# Patient Record
Sex: MALE | Race: WHITE | Employment: OTHER | URBAN - METROPOLITAN AREA
[De-identification: names, ages, dates, MRNs, and addresses within clinical notes are randomized per-mention and may not be internally consistent; named-entity substitution may affect disease eponyms.]

---

## 2017-01-04 RX ORDER — ATORVASTATIN CALCIUM 40 MG/1
TABLET, FILM COATED ORAL
Qty: 90 TABLET | Refills: 1 | Status: SHIPPED | OUTPATIENT
Start: 2017-01-04 | End: 2017-06-30 | Stop reason: SDUPTHER

## 2017-01-25 RX ORDER — GABAPENTIN 300 MG/1
CAPSULE ORAL
Qty: 270 CAPSULE | Refills: 1 | Status: SHIPPED | OUTPATIENT
Start: 2017-01-25 | End: 2017-10-10 | Stop reason: SDUPTHER

## 2017-01-25 RX ORDER — ALLOPURINOL 300 MG/1
TABLET ORAL
Qty: 90 TABLET | Refills: 1 | Status: SHIPPED | OUTPATIENT
Start: 2017-01-25 | End: 2017-07-21 | Stop reason: SDUPTHER

## 2017-01-25 RX ORDER — DICLOFENAC SODIUM 75 MG/1
TABLET, DELAYED RELEASE ORAL
Qty: 180 TABLET | Refills: 1 | Status: SHIPPED | OUTPATIENT
Start: 2017-01-25 | End: 2017-07-21 | Stop reason: SDUPTHER

## 2017-04-04 ENCOUNTER — OFFICE VISIT (OUTPATIENT)
Dept: INTERNAL MEDICINE | Age: 67
End: 2017-04-04

## 2017-04-04 VITALS — HEIGHT: 72 IN | BODY MASS INDEX: 42.66 KG/M2 | WEIGHT: 315 LBS

## 2017-04-04 DIAGNOSIS — E66.01 MORBID OBESITY DUE TO EXCESS CALORIES (HCC): ICD-10-CM

## 2017-04-04 DIAGNOSIS — Z79.4 TYPE 2 DIABETES MELLITUS WITHOUT COMPLICATION, WITH LONG-TERM CURRENT USE OF INSULIN (HCC): ICD-10-CM

## 2017-04-04 DIAGNOSIS — Z85.46 PERSONAL HISTORY OF MALIGNANT NEOPLASM OF PROSTATE: ICD-10-CM

## 2017-04-04 DIAGNOSIS — F17.200 TOBACCO USE DISORDER: ICD-10-CM

## 2017-04-04 DIAGNOSIS — Z87.891 PERSONAL HISTORY OF TOBACCO USE: ICD-10-CM

## 2017-04-04 DIAGNOSIS — E66.01 MORBID OBESITY WITH BMI OF 45.0-49.9, ADULT (HCC): ICD-10-CM

## 2017-04-04 DIAGNOSIS — I10 ESSENTIAL HYPERTENSION: Primary | ICD-10-CM

## 2017-04-04 DIAGNOSIS — G47.30 SLEEP APNEA, UNSPECIFIED TYPE: ICD-10-CM

## 2017-04-04 DIAGNOSIS — K21.9 GASTROESOPHAGEAL REFLUX DISEASE WITHOUT ESOPHAGITIS: ICD-10-CM

## 2017-04-04 DIAGNOSIS — E78.2 MIXED HYPERLIPIDEMIA: ICD-10-CM

## 2017-04-04 DIAGNOSIS — E11.9 TYPE 2 DIABETES MELLITUS WITHOUT COMPLICATION, WITH LONG-TERM CURRENT USE OF INSULIN (HCC): ICD-10-CM

## 2017-04-04 PROCEDURE — 99214 OFFICE O/P EST MOD 30 MIN: CPT | Performed by: INTERNAL MEDICINE

## 2017-04-04 PROCEDURE — G0296 VISIT TO DETERM LDCT ELIG: HCPCS | Performed by: INTERNAL MEDICINE

## 2017-04-04 RX ORDER — LISINOPRIL 30 MG/1
30 TABLET ORAL DAILY
COMMUNITY
Start: 2017-04-04

## 2017-04-04 ASSESSMENT — ENCOUNTER SYMPTOMS
EYES NEGATIVE: 1
ALLERGIC/IMMUNOLOGIC NEGATIVE: 1
APNEA: 1
GASTROINTESTINAL NEGATIVE: 1

## 2017-04-06 ENCOUNTER — HOSPITAL ENCOUNTER (OUTPATIENT)
Dept: CT IMAGING | Age: 67
Discharge: HOME | End: 2017-04-06
Attending: INTERNAL MEDICINE | Admitting: INTERNAL MEDICINE

## 2017-04-06 DIAGNOSIS — Z87.891 PERSONAL HISTORY OF NICOTINE DEPENDENCE: ICD-10-CM

## 2017-04-06 DIAGNOSIS — Z87.891 PERSONAL HISTORY OF TOBACCO USE: ICD-10-CM

## 2017-04-10 ENCOUNTER — TELEPHONE (OUTPATIENT)
Dept: CASE MANAGEMENT | Age: 67
End: 2017-04-10

## 2017-06-30 RX ORDER — ATORVASTATIN CALCIUM 40 MG/1
TABLET, FILM COATED ORAL
Qty: 90 TABLET | Refills: 0 | Status: SHIPPED | OUTPATIENT
Start: 2017-06-30 | End: 2017-09-27 | Stop reason: SDUPTHER

## 2017-07-03 RX ORDER — OMEPRAZOLE 40 MG/1
CAPSULE, DELAYED RELEASE ORAL
Qty: 90 CAPSULE | Refills: 0 | Status: SHIPPED | OUTPATIENT
Start: 2017-07-03 | End: 2017-09-29 | Stop reason: SDUPTHER

## 2017-07-21 RX ORDER — ALLOPURINOL 300 MG/1
TABLET ORAL
Qty: 90 TABLET | Refills: 0 | Status: SHIPPED | OUTPATIENT
Start: 2017-07-21 | End: 2017-10-18 | Stop reason: SDUPTHER

## 2017-07-21 RX ORDER — DICLOFENAC SODIUM 75 MG/1
TABLET, DELAYED RELEASE ORAL
Qty: 180 TABLET | Refills: 0 | Status: SHIPPED | OUTPATIENT
Start: 2017-07-21 | End: 2017-10-18 | Stop reason: SDUPTHER

## 2017-08-04 ENCOUNTER — TELEPHONE (OUTPATIENT)
Dept: INTERNAL MEDICINE | Age: 67
End: 2017-08-04

## 2017-09-13 DIAGNOSIS — K21.9 GASTROESOPHAGEAL REFLUX DISEASE WITHOUT ESOPHAGITIS: ICD-10-CM

## 2017-09-13 DIAGNOSIS — G47.30 SLEEP APNEA, UNSPECIFIED TYPE: ICD-10-CM

## 2017-09-13 DIAGNOSIS — F17.200 TOBACCO USE DISORDER: ICD-10-CM

## 2017-09-13 DIAGNOSIS — E78.2 MIXED HYPERLIPIDEMIA: ICD-10-CM

## 2017-09-13 DIAGNOSIS — Z79.4 TYPE 2 DIABETES MELLITUS WITHOUT COMPLICATION, WITH LONG-TERM CURRENT USE OF INSULIN (HCC): ICD-10-CM

## 2017-09-13 DIAGNOSIS — I10 ESSENTIAL HYPERTENSION: ICD-10-CM

## 2017-09-13 DIAGNOSIS — E11.9 TYPE 2 DIABETES MELLITUS WITHOUT COMPLICATION, WITH LONG-TERM CURRENT USE OF INSULIN (HCC): ICD-10-CM

## 2017-09-13 DIAGNOSIS — Z87.891 PERSONAL HISTORY OF TOBACCO USE: ICD-10-CM

## 2017-09-13 DIAGNOSIS — E66.01 MORBID OBESITY DUE TO EXCESS CALORIES (HCC): ICD-10-CM

## 2017-09-13 DIAGNOSIS — Z85.46 PERSONAL HISTORY OF MALIGNANT NEOPLASM OF PROSTATE: ICD-10-CM

## 2017-09-13 LAB
A/G RATIO: 1.6 (ref 1.1–2.2)
ALBUMIN SERPL-MCNC: 4.2 G/DL (ref 3.4–5)
ALP BLD-CCNC: 134 U/L (ref 40–129)
ALT SERPL-CCNC: 30 U/L (ref 10–40)
ANION GAP SERPL CALCULATED.3IONS-SCNC: 16 MMOL/L (ref 3–16)
AST SERPL-CCNC: 20 U/L (ref 15–37)
BASOPHILS ABSOLUTE: 0.1 K/UL (ref 0–0.2)
BASOPHILS RELATIVE PERCENT: 0.9 %
BILIRUB SERPL-MCNC: 0.3 MG/DL (ref 0–1)
BILIRUBIN URINE: NEGATIVE
BLOOD, URINE: NEGATIVE
BUN BLDV-MCNC: 23 MG/DL (ref 7–20)
CALCIUM SERPL-MCNC: 10.2 MG/DL (ref 8.3–10.6)
CHLORIDE BLD-SCNC: 100 MMOL/L (ref 99–110)
CHOLESTEROL, TOTAL: 154 MG/DL (ref 0–199)
CLARITY: CLEAR
CO2: 26 MMOL/L (ref 21–32)
COLOR: YELLOW
CREAT SERPL-MCNC: 0.9 MG/DL (ref 0.8–1.3)
CREATININE URINE: 73.7 MG/DL (ref 39–259)
EOSINOPHILS ABSOLUTE: 0.3 K/UL (ref 0–0.6)
EOSINOPHILS RELATIVE PERCENT: 4.4 %
GFR AFRICAN AMERICAN: >60
GFR NON-AFRICAN AMERICAN: >60
GLOBULIN: 2.6 G/DL
GLUCOSE BLD-MCNC: 168 MG/DL (ref 70–99)
GLUCOSE URINE: 100 MG/DL
HCT VFR BLD CALC: 44.9 % (ref 40.5–52.5)
HDLC SERPL-MCNC: 38 MG/DL (ref 40–60)
HEMOGLOBIN: 14.7 G/DL (ref 13.5–17.5)
KETONES, URINE: NEGATIVE MG/DL
LDL CHOLESTEROL CALCULATED: 75 MG/DL
LEUKOCYTE ESTERASE, URINE: NEGATIVE
LYMPHOCYTES ABSOLUTE: 1.8 K/UL (ref 1–5.1)
LYMPHOCYTES RELATIVE PERCENT: 22.4 %
MCH RBC QN AUTO: 28.1 PG (ref 26–34)
MCHC RBC AUTO-ENTMCNC: 32.6 G/DL (ref 31–36)
MCV RBC AUTO: 86.2 FL (ref 80–100)
MICROALBUMIN UR-MCNC: <1.2 MG/DL
MICROALBUMIN/CREAT UR-RTO: NORMAL MG/G (ref 0–30)
MICROSCOPIC EXAMINATION: ABNORMAL
MONOCYTES ABSOLUTE: 0.6 K/UL (ref 0–1.3)
MONOCYTES RELATIVE PERCENT: 7.1 %
NEUTROPHILS ABSOLUTE: 5.1 K/UL (ref 1.7–7.7)
NEUTROPHILS RELATIVE PERCENT: 65.2 %
NITRITE, URINE: NEGATIVE
PDW BLD-RTO: 17.5 % (ref 12.4–15.4)
PH UA: 5.5
PLATELET # BLD: 266 K/UL (ref 135–450)
PMV BLD AUTO: 8.1 FL (ref 5–10.5)
POTASSIUM SERPL-SCNC: 5.1 MMOL/L (ref 3.5–5.1)
PROSTATE SPECIFIC ANTIGEN: 5.26 NG/ML (ref 0–4)
PROTEIN UA: NEGATIVE MG/DL
RBC # BLD: 5.21 M/UL (ref 4.2–5.9)
SODIUM BLD-SCNC: 142 MMOL/L (ref 136–145)
SPECIFIC GRAVITY UA: 1.02
TOTAL PROTEIN: 6.8 G/DL (ref 6.4–8.2)
TRIGL SERPL-MCNC: 204 MG/DL (ref 0–150)
TSH SERPL DL<=0.05 MIU/L-ACNC: 1.44 UIU/ML (ref 0.27–4.2)
URINE TYPE: ABNORMAL
UROBILINOGEN, URINE: 0.2 E.U./DL
VLDLC SERPL CALC-MCNC: 41 MG/DL
WBC # BLD: 7.8 K/UL (ref 4–11)

## 2017-09-14 LAB
ESTIMATED AVERAGE GLUCOSE: 148.5 MG/DL
HBA1C MFR BLD: 6.8 %

## 2017-09-27 RX ORDER — ATORVASTATIN CALCIUM 40 MG/1
TABLET, FILM COATED ORAL
Qty: 90 TABLET | Refills: 1 | Status: SHIPPED | OUTPATIENT
Start: 2017-09-27 | End: 2018-03-26 | Stop reason: SDUPTHER

## 2017-09-29 RX ORDER — OMEPRAZOLE 40 MG/1
CAPSULE, DELAYED RELEASE ORAL
Qty: 90 CAPSULE | Refills: 1 | Status: SHIPPED | OUTPATIENT
Start: 2017-09-29 | End: 2018-03-26 | Stop reason: SDUPTHER

## 2017-10-04 ENCOUNTER — OFFICE VISIT (OUTPATIENT)
Dept: INTERNAL MEDICINE | Age: 67
End: 2017-10-04

## 2017-10-04 VITALS
HEART RATE: 76 BPM | RESPIRATION RATE: 16 BRPM | SYSTOLIC BLOOD PRESSURE: 126 MMHG | BODY MASS INDEX: 42.66 KG/M2 | HEIGHT: 72 IN | DIASTOLIC BLOOD PRESSURE: 76 MMHG | WEIGHT: 315 LBS

## 2017-10-04 DIAGNOSIS — E66.01 MORBID OBESITY DUE TO EXCESS CALORIES (HCC): ICD-10-CM

## 2017-10-04 DIAGNOSIS — E78.2 MIXED HYPERLIPIDEMIA: ICD-10-CM

## 2017-10-04 DIAGNOSIS — Z79.4 TYPE 2 DIABETES MELLITUS WITHOUT COMPLICATION, WITH LONG-TERM CURRENT USE OF INSULIN (HCC): ICD-10-CM

## 2017-10-04 DIAGNOSIS — K21.9 GASTROESOPHAGEAL REFLUX DISEASE WITHOUT ESOPHAGITIS: ICD-10-CM

## 2017-10-04 DIAGNOSIS — R29.898 WEAKNESS OF BOTH LOWER EXTREMITIES: ICD-10-CM

## 2017-10-04 DIAGNOSIS — G47.30 SLEEP APNEA, UNSPECIFIED TYPE: ICD-10-CM

## 2017-10-04 DIAGNOSIS — Z00.00 WELL ADULT EXAM: Primary | ICD-10-CM

## 2017-10-04 DIAGNOSIS — Z00.00 ROUTINE GENERAL MEDICAL EXAMINATION AT A HEALTH CARE FACILITY: ICD-10-CM

## 2017-10-04 DIAGNOSIS — F17.200 TOBACCO USE DISORDER: ICD-10-CM

## 2017-10-04 DIAGNOSIS — Z23 NEED FOR INFLUENZA VACCINATION: ICD-10-CM

## 2017-10-04 DIAGNOSIS — I10 ESSENTIAL HYPERTENSION: ICD-10-CM

## 2017-10-04 DIAGNOSIS — E11.9 TYPE 2 DIABETES MELLITUS WITHOUT COMPLICATION, WITH LONG-TERM CURRENT USE OF INSULIN (HCC): ICD-10-CM

## 2017-10-04 DIAGNOSIS — Z85.46 PERSONAL HISTORY OF MALIGNANT NEOPLASM OF PROSTATE: ICD-10-CM

## 2017-10-04 PROCEDURE — G0438 PPPS, INITIAL VISIT: HCPCS | Performed by: INTERNAL MEDICINE

## 2017-10-04 PROCEDURE — 90662 IIV NO PRSV INCREASED AG IM: CPT | Performed by: INTERNAL MEDICINE

## 2017-10-04 PROCEDURE — G0008 ADMIN INFLUENZA VIRUS VAC: HCPCS | Performed by: INTERNAL MEDICINE

## 2017-10-04 PROCEDURE — 93000 ELECTROCARDIOGRAM COMPLETE: CPT | Performed by: INTERNAL MEDICINE

## 2017-10-04 PROCEDURE — 99214 OFFICE O/P EST MOD 30 MIN: CPT | Performed by: INTERNAL MEDICINE

## 2017-10-04 ASSESSMENT — ENCOUNTER SYMPTOMS
GASTROINTESTINAL NEGATIVE: 1
EYES NEGATIVE: 1
APNEA: 1
ALLERGIC/IMMUNOLOGIC NEGATIVE: 1

## 2017-10-04 ASSESSMENT — LIFESTYLE VARIABLES: HOW OFTEN DO YOU HAVE A DRINK CONTAINING ALCOHOL: 0

## 2017-10-04 ASSESSMENT — ANXIETY QUESTIONNAIRES: GAD7 TOTAL SCORE: 0

## 2017-10-04 ASSESSMENT — PATIENT HEALTH QUESTIONNAIRE - PHQ9: SUM OF ALL RESPONSES TO PHQ QUESTIONS 1-9: 0

## 2017-10-04 NOTE — PATIENT INSTRUCTIONS
Personalized Preventive Plan for Félix Peng - 10/4/2017  Medicare offers a range of preventive health benefits. Some of the tests and screenings are paid in full while other may be subject to a deductible, co-insurance, and/or copay. Some of these benefits include a comprehensive review of your medical history including lifestyle, illnesses that may run in your family, and various assessments and screenings as appropriate. After reviewing your medical record and screening and assessments performed today your provider may have ordered immunizations, labs, imaging, and/or referrals for you. A list of these orders (if applicable) as well as your Preventive Care list are included within your After Visit Summary for your review. Other Preventive Recommendations:    · A preventive eye exam performed by an eye specialist is recommended every 1-2 years to screen for glaucoma; cataracts, macular degeneration, and other eye disorders. · A preventive dental visit is recommended every 6 months. · Try to get at least 150 minutes of exercise per week or 10,000 steps per day on a pedometer . · Order or download the FREE \"Exercise & Physical Activity: Your Everyday Guide\" from [employer]. Call 8-493.970.9793 or search [employer] online. · You need 8423-0760 mg of calcium and 7903-3529 IU of vitamin D per day. It is possible to meet your calcium requirement with diet alone, but a vitamin D supplement is usually necessary to meet this goal.  · When exposed to the sun, use a sunscreen that protects against both UVA and UVB radiation with an SPF of 30 or greater. Reapply every 2 to 3 hours or after sweating, drying off with a towel, or swimming. · Always wear a seat belt when traveling in a car. Always wear a helmet when riding a bicycle or motorcycle.

## 2017-10-04 NOTE — ASSESSMENT & PLAN NOTE
Needs to see Dr Kirk Gonsalez he say he will most likely not see him he understands ramifications of not following threw

## 2017-10-04 NOTE — MR AVS SNAPSHOT
After Visit Summary             Angelita Rangel   10/4/2017 10:30 AM   Office Visit    Description:  Male : 1950   Provider:  Tony Navarro MD   Department:  [facility] [address]              Your Follow-Up and Future Appointments         Below is a list of your follow-up and future appointments. This may not be a complete list as you may have made appointments directly with providers that we are not aware of or your providers may have made some for you. Please call your providers to confirm appointments. It is important to keep your appointments. Please bring your current insurance card, photo ID, co-pay, and all medication bottles to your appointment. If self-pay, payment is expected at the time of service. Your To-Do List     Follow-Up    Return in about 3 months (around 2018). Information from Your Visit        Department     Name Address Phone Fax    [facility] [address] [place] [address] [phone]      You Were Seen for:         Comments    Well adult exam   [[id number]]         Vital Signs     Blood Pressure Pulse Respirations Height Weight Body Mass Index    126/76 (Site: Right Arm, Position: Sitting, Cuff Size: Large Adult) 76 16 6' 0.25\" (1.835 m) 341 lb 12.8 oz (155 kg) 46.04 kg/m2    Smoking Status                   Current Every Day Smoker           Additional Information about your Body Mass Index (BMI)           Your BMI as listed above is considered obese (30 or more). BMI is an estimate of body fat, calculated from your height and weight. The higher your BMI, the greater your risk of heart disease, high blood pressure, type 2 diabetes, stroke, gallstones, arthritis, sleep apnea, and certain cancers. BMI is not perfect. It may overestimate body fat in athletes and people who are more muscular.   Even a small weight loss (between 5 and 10 percent of your current weight) by decreasing your calorie intake and Blood Glucose Monitoring Suppl (ONE TOUCH ULTRA) by Does not apply route. Test strips      Allergies           No Known Allergies      We Ordered/Performed the following           EKG 12 Lead          Result Summary for EKG 12 Lead      Result Information     Status          Edited Result - FINAL (Resulted: 10/4/2017 12:00 AM)           10/4/2017 10:55 AM      Scans on Order [id number]            ECG on 10/4/2017 10:49 AM : ECG Report                     Additional Information        Basic Information     Date Of Birth Sex Race Ethnicity Preferred Language    1950 Male White Non-/Non  English      Problem List as of 10/4/2017  Date Reviewed: 4/4/2017                GERD (gastroesophageal reflux disease)    Weakness of both lower extremities    Well adult exam    Type 2 diabetes mellitus without complication ([address]    Morbid obesity ([address]    Essential hypertension    Mixed hyperlipidemia    Personal history of malignant neoplasm of prostate    Sleep apnea    Tobacco use disorder      Immunizations as of 10/4/2017     Name Date    Influenza Virus Vaccine 11/16/2014, 11/16/2013, 9/27/2012    Influenza Whole 10/28/2011, 10/31/2008    Influenza, High Dose 9/4/2016, 9/16/2015    Pneumococcal 13-valent Conjugate ([id number]) 9/16/2015    Pneumococcal Polysaccharide ([id number]) 12/16/2014    Tdap (Boostrix, Adacel) 6/12/2013    Zoster 10/18/2013      Preventive Care        Date Due    One-time abdominal aortic aneurism (AAA) screening is recommended for all men between the age of 73-68 who have ever smoked 1950    Hepatitis C screening is recommended for all adults regardless of risk factors born between [facility] at least once (lifetime) who have never been tested.  1950    Colonoscopy 6/9/1998    Diabetic Foot Exam 4/29/2017    Yearly Flu Vaccine (1) 9/1/2017    Eye Exam By An Eye Doctor 8/4/2018    Hemoglobin A1C (Test For Long-Term Glucose Control) 9/13/2018 Urine Check For Kidney Problems 9/13/2018    Cholesterol Screening 9/13/2018    Tetanus Combination Vaccine (2 - Td) 6/12/2023            [employer] Signup           Thank you for enrolling in [address] Please follow the instructions below to securely access your online medical record. [employer] allows you to send messages to your doctor, view your test results, renew your prescriptions, schedule appointments, and more. How Do I Sign Up? 1. In your Internet browser, go to [email or web address]  2. Click on the Sign Up Now link in the Sign In box. You will see the New Member Sign Up page. 3. Enter your [employer] Access Code exactly as it appears below. You will not need to use this code after youve completed the sign-up process. If you do not sign up before the expiration date, you must request a new code. [employer] Access Code: [email or web address]  Expires: 12/3/2017 11:18 AM    4. Enter your Social Security Number ([id number]) and Date of Birth (mm/dd/yyyy) as indicated and click Submit. You will be taken to the next sign-up page. 5. Create a [employer] ID. This will be your [employer] login ID and cannot be changed, so think of one that is secure and easy to remember. 6. Create a [employer] password. You can change your password at any time. 7. Enter your Password Reset Question and Answer. This can be used at a later time if you forget your password. 8. Enter your e-mail address. You will receive e-mail notification when new information is available in [address] 9. Click Sign Up. You can now view your medical record. Additional Information  If you have questions, please contact the physician practice where you receive care. Remember, [employer] is NOT to be used for urgent needs. For medical emergencies, dial 911. For questions regarding your [employer] account call [phone]. If you have a clinical question, please call your doctor's office.

## 2017-10-04 NOTE — ASSESSMENT & PLAN NOTE
Within normal limits for age-retired no ADL issues,immunizations up to date, no depression ,no cognitive impairment  Colonoscopy needs 10 year F/U refer to Dr Minerva Roche -still needs to be done   Eye exam up to date  Exercises as tolerated-needs to     No living will but does  want resuscitation -but no life support long term

## 2017-10-04 NOTE — PROGRESS NOTES
motion. Tenderness: knees improved    Neurological: He is alert and oriented to person, place, and time. He has normal reflexes. DIABETIC FOOT EXAM:   normal DP and PT pulses, no trophic changes or ulcerative lesions and normal sensory exam     Skin: Skin is warm and dry. Mult keratotic lesions of legs    Psychiatric: He has a normal mood and affect. His behavior is normal. Thought content normal.       Assessment:              Plan:        Essential hypertension  Stable continue current meds and return in 3 mo. GERD (gastroesophageal reflux disease)  Diet and meds well controled     Mixed hyperlipidemia  Stable continue current meds and return in 3 mo. Morbid obesity (HCC)  Down 8#    Personal history of malignant neoplasm of prostate  Needs to see Dr Keys Aas he say he will most likely not see him he understands ramifications of not following threw     Sleep apnea  Cont to use C-pap     Tobacco use disorder  Had neg.  CT as noted refuses to DC for now     Type 2 diabetes mellitus without complication ([facility]  Cont improved control cont current Tx     Weakness of both lower extremities  As noted     Well adult exam  Within normal limits for age-retired no ADL issues,immunizations up to date, no depression ,no cognitive impairment  Colonoscopy needs 10 year F/U refer to Dr Minerva Roche -still needs to be done   Eye exam up to date  Exercises as tolerated-needs to     No living will but does  want resuscitation -but no life support long term           Mandy Gobble  1950    No Known Allergies  Current Outpatient Prescriptions   Medication Sig Dispense Refill    insulin glargine (TOUJEO SOLOSTAR) 300 UNIT/ML injection pen Inject 25 Units into the skin nightly      omeprazole (PRILOSEC) 40 MG delayed release capsule TAKE ONE CAPSULE BY MOUTH DAILY 90 capsule 1    atorvastatin (LIPITOR) 40 MG tablet TAKE ONE TABLET BY MOUTH DAILY 90 tablet 1    diclofenac (VOLTAREN) 75 MG EC tablet TAKE ONE TABLET BY MOUTH TWICE A  tablet 0    allopurinol (ZYLOPRIM) 300 MG tablet TAKE ONE TABLET BY MOUTH DAILY 90 tablet 0    metFORMIN (GLUCOPHAGE) 500 MG tablet TAKE TWO TABLETS BY MOUTH TWICE A  tablet 0    lisinopril (PRINIVIL;ZESTRIL) 30 MG tablet Take 1 tablet by mouth daily      Dulaglutide 0.75 MG/0.5ML SOPN Inject 1.5 mg into the skin once a week 12 Pen 1    gabapentin (NEURONTIN) 300 MG capsule TAKE ONE CAPSULE BY MOUTH THREE TIMES A  capsule 1    CPAP Machine MISC by Does not apply route Pressure needs to be raised to 15 2 each 5    Respiratory Therapy Supplies RACHID 1 Device by Does not apply route every 3 hours as needed. Mask & Supplies Dx: Sleep Apnea 780.57 1 Device 0    fish oil-omega-3 fatty acids (FISH OIL) 1000 MG capsule Take 1 g by mouth daily       aspirin 81 MG EC tablet Take 81 mg by mouth daily.  B-D ULTRA FINE LANCETS MISC by Does not apply route. 29 g x12mm       Blood Glucose Monitoring Suppl (ONE TOUCH ULTRA) by Does not apply route. Test strips       No current facility-administered medications for this visit. Vitals:    10/04/17 1024   BP: 126/76   Site: Right Arm   Position: Sitting   Cuff Size: Large Adult   Pulse: 76   Resp: 16   Weight: (!) 341 lb 12.8 oz (155 kg)   Height: 6' 0.25\" (1.835 m)     Body mass index is 46.04 kg/(m^2). Wt Readings from Last 3 Encounters:   10/04/17 (!) 341 lb 12.8 oz (155 kg)   04/04/17 (!) 349 lb 12.8 oz (158.7 kg)   10/04/16 (!) 350 lb 3.2 oz (158.8 kg)     BP Readings from Last 3 Encounters:   10/04/17 126/76   10/04/16 138/78   04/29/16 132/80       Consultants:   Patient Care Team:  Jens Garcia MD as PCP - General (Internal Medicine)  Jens Garcia MD as PCP - S Attributed Provider  Yesica Yusuf.  Jillian Grace (Inactive) as Consulting Physician (Endocrinology)  Margaret Ramos as Consulting Physician (Ophthalmology)    Chief Complaint:   Kirill Wesley is a 69 y.o. male who presents for Medicare Preventive Physical Examination with Personalized Prevention Plan Services. HPI: Tr review listed chronic problems     Patient Active Problem List   Diagnosis    Type 2 diabetes mellitus without complication ([facility]    Morbid obesity ([facility]    Essential hypertension    Mixed hyperlipidemia    Personal history of malignant neoplasm of prostate    Sleep apnea    Tobacco use disorder    Well adult exam    Weakness of both lower extremities    GERD (gastroesophageal reflux disease)       Mood Disorders Screen:  Risk factors: livesd alone and health issdues  Symptoms:  endorses fatigue, denies depressed mood, difficulty concentrating, difficulty sleeping and changes in appetite     Safety Assessment:  Functional ability/ADLs:  Difficulty with bathing- no, grooming- no, meals- no, incontinence- yes - stress symptoms . Driving- yes. Home safety: Lives alone. Number of stairs to enter home: 1, within home: 12 between floors. Risk factors for falls: poor nutritional status morbis obesity. Home environment modifications:  yes - rails in BR . End of Life Planning:  Advanced Directive: has an advanced directive - a copy has been provided.       Preventive Care:  Self-testicular exams: Yes  Last PSA: 5.26, abnormal  Last colonoscopy: 1998, normal-refuses F/U  AAA screening:  no   Last eye exam: 2017, glasses early cataracts no diabetic changes  Exercise: refuses to  Seatbelt use: yes      Immunization History   Administered Date(s) Administered    Influenza Virus Vaccine 09/27/2012, 11/16/2013, 11/16/2014    Influenza Whole 10/31/2008, 10/28/2011    Influenza, High Dose 09/16/2015, 09/04/2016    Pneumococcal 13-valent Conjugate (Fnlfpxd37) 09/16/2015    Pneumococcal Polysaccharide (Izlmzqbql04) 12/16/2014    Tdap (Boostrix, Adacel) 06/12/2013    Zoster 10/18/2013       Past Medical History:   Diagnosis Date    Cancer [facility]) 2007    Prostate Tx with rads Dr Rivera Stoner Hyperlipidemia     Type II or unspecified type diabetes Yes Historical Provider, MD     Past Medical History:   Diagnosis Date    Cancer [facility]) 2007    Prostate Tx with rads Dr Liu Celeste Hyperlipidemia     Type II or unspecified type diabetes mellitus without mention of complication, not stated as uncontrolled     now seeing Dr Raine Rojas     Past Surgical History:   Procedure Laterality Date    [place]    normal     PROSTATE SURGERY  2007    positive biopsy for cancer     Family History   Problem Relation Age of Onset    Coronary Art Dis Father     Heart Disease Father     Diabetes Brother        CareTeam (Including outside providers/suppliers regularly involved in providing care):   Patient Care Team:  John Kiser MD as PCP - General (Internal Medicine)  John Kiser MD as PCP - S Attributed Provider  Trey Bañuelos. Mar Stanton (Inactive) as Consulting Physician (Endocrinology)  Ed Carrasco as Consulting Physician (Ophthalmology)    Wt Readings from Last 3 Encounters:   10/04/17 (!) 341 lb 12.8 oz (155 kg)   04/04/17 (!) 349 lb 12.8 oz (158.7 kg)   10/04/16 (!) 350 lb 3.2 oz (158.8 kg)     Vitals:    10/04/17 1024   BP: 126/76   Site: Right Arm   Position: Sitting   Cuff Size: Large Adult   Pulse: 76   Resp: 16   Weight: (!) 341 lb 12.8 oz (155 kg)   Height: 6' 0.25\" (1.835 m)       See above    The following problems were reviewed today and where indicated follow up appointments were made and/or referrals ordered. Risk Factor Screenings with Interventions:   Fall Risk:  2 or more falls in past year?: no  Fall with injury in past year?: no  Fall Risk Interventions:    · Home safety tips provided  · Home exercises provided to promote strength and balance    Depression:  PHQ-2 Score: 0  Depression Interventions:  · None indicated    Anxiety:  Anxiety Score: 0  Anxiety Interventions:  · None indicated    Cognitive:   Words recalled: 3  Clock Drawing Test (CDT) Score: Normal  Cognitive Impairment Interventions:  · None

## 2017-10-10 RX ORDER — GABAPENTIN 300 MG/1
CAPSULE ORAL
Qty: 270 CAPSULE | Refills: 1 | Status: SHIPPED | OUTPATIENT
Start: 2017-10-10 | End: 2018-04-07 | Stop reason: SDUPTHER

## 2017-10-18 RX ORDER — DICLOFENAC SODIUM 75 MG/1
TABLET, DELAYED RELEASE ORAL
Qty: 180 TABLET | Refills: 1 | Status: SHIPPED | OUTPATIENT
Start: 2017-10-18 | End: 2018-04-13 | Stop reason: SDUPTHER

## 2017-10-18 RX ORDER — ALLOPURINOL 300 MG/1
TABLET ORAL
Qty: 90 TABLET | Refills: 1 | Status: SHIPPED | OUTPATIENT
Start: 2017-10-18 | End: 2018-04-13 | Stop reason: SDUPTHER

## 2018-03-26 RX ORDER — OMEPRAZOLE 40 MG/1
CAPSULE, DELAYED RELEASE ORAL
Qty: 90 CAPSULE | Refills: 0 | Status: SHIPPED | OUTPATIENT
Start: 2018-03-26 | End: 2018-06-21 | Stop reason: SDUPTHER

## 2018-03-26 RX ORDER — ATORVASTATIN CALCIUM 40 MG/1
TABLET, FILM COATED ORAL
Qty: 90 TABLET | Refills: 0 | Status: SHIPPED | OUTPATIENT
Start: 2018-03-26 | End: 2018-06-20 | Stop reason: SDUPTHER

## 2018-04-02 ENCOUNTER — OFFICE VISIT (OUTPATIENT)
Dept: INTERNAL MEDICINE | Age: 68
End: 2018-04-02

## 2018-04-02 VITALS
HEART RATE: 68 BPM | BODY MASS INDEX: 42.66 KG/M2 | DIASTOLIC BLOOD PRESSURE: 70 MMHG | HEIGHT: 72 IN | SYSTOLIC BLOOD PRESSURE: 120 MMHG | RESPIRATION RATE: 16 BRPM | WEIGHT: 315 LBS

## 2018-04-02 DIAGNOSIS — E11.9 TYPE 2 DIABETES MELLITUS WITHOUT COMPLICATION, WITH LONG-TERM CURRENT USE OF INSULIN (HCC): ICD-10-CM

## 2018-04-02 DIAGNOSIS — Z85.46 PERSONAL HISTORY OF MALIGNANT NEOPLASM OF PROSTATE: ICD-10-CM

## 2018-04-02 DIAGNOSIS — G47.30 SLEEP APNEA, UNSPECIFIED TYPE: ICD-10-CM

## 2018-04-02 DIAGNOSIS — F17.200 TOBACCO USE DISORDER: ICD-10-CM

## 2018-04-02 DIAGNOSIS — E78.2 MIXED HYPERLIPIDEMIA: ICD-10-CM

## 2018-04-02 DIAGNOSIS — E66.01 MORBID OBESITY (HCC): ICD-10-CM

## 2018-04-02 DIAGNOSIS — I10 ESSENTIAL HYPERTENSION: ICD-10-CM

## 2018-04-02 DIAGNOSIS — Z79.4 TYPE 2 DIABETES MELLITUS WITHOUT COMPLICATION, WITH LONG-TERM CURRENT USE OF INSULIN (HCC): ICD-10-CM

## 2018-04-02 DIAGNOSIS — I10 ESSENTIAL HYPERTENSION: Primary | ICD-10-CM

## 2018-04-02 DIAGNOSIS — K21.9 GASTROESOPHAGEAL REFLUX DISEASE WITHOUT ESOPHAGITIS: ICD-10-CM

## 2018-04-02 DIAGNOSIS — E66.01 MORBID OBESITY WITH BMI OF 45.0-49.9, ADULT (HCC): ICD-10-CM

## 2018-04-02 LAB
A/G RATIO: 1.7 (ref 1.1–2.2)
ALBUMIN SERPL-MCNC: 4.2 G/DL (ref 3.4–5)
ALP BLD-CCNC: 131 U/L (ref 40–129)
ALT SERPL-CCNC: 17 U/L (ref 10–40)
ANION GAP SERPL CALCULATED.3IONS-SCNC: 21 MMOL/L (ref 3–16)
AST SERPL-CCNC: 18 U/L (ref 15–37)
BASOPHILS ABSOLUTE: 0.1 K/UL (ref 0–0.2)
BASOPHILS RELATIVE PERCENT: 0.8 %
BILIRUB SERPL-MCNC: 0.4 MG/DL (ref 0–1)
BUN BLDV-MCNC: 32 MG/DL (ref 7–20)
CALCIUM SERPL-MCNC: 9.4 MG/DL (ref 8.3–10.6)
CHLORIDE BLD-SCNC: 100 MMOL/L (ref 99–110)
CHOLESTEROL, TOTAL: 141 MG/DL (ref 0–199)
CO2: 22 MMOL/L (ref 21–32)
CREAT SERPL-MCNC: 1 MG/DL (ref 0.8–1.3)
EOSINOPHILS ABSOLUTE: 0.3 K/UL (ref 0–0.6)
EOSINOPHILS RELATIVE PERCENT: 4 %
GFR AFRICAN AMERICAN: >60
GFR NON-AFRICAN AMERICAN: >60
GLOBULIN: 2.5 G/DL
GLUCOSE BLD-MCNC: 106 MG/DL (ref 70–99)
HCT VFR BLD CALC: 41.5 % (ref 40.5–52.5)
HDLC SERPL-MCNC: 46 MG/DL (ref 40–60)
HEMOGLOBIN: 13.6 G/DL (ref 13.5–17.5)
LDL CHOLESTEROL CALCULATED: 69 MG/DL
LYMPHOCYTES ABSOLUTE: 1.4 K/UL (ref 1–5.1)
LYMPHOCYTES RELATIVE PERCENT: 22.1 %
MCH RBC QN AUTO: 28 PG (ref 26–34)
MCHC RBC AUTO-ENTMCNC: 32.9 G/DL (ref 31–36)
MCV RBC AUTO: 85.2 FL (ref 80–100)
MONOCYTES ABSOLUTE: 0.5 K/UL (ref 0–1.3)
MONOCYTES RELATIVE PERCENT: 8.3 %
NEUTROPHILS ABSOLUTE: 4.1 K/UL (ref 1.7–7.7)
NEUTROPHILS RELATIVE PERCENT: 64.8 %
PDW BLD-RTO: 17 % (ref 12.4–15.4)
PLATELET # BLD: 294 K/UL (ref 135–450)
PMV BLD AUTO: 7.8 FL (ref 5–10.5)
POTASSIUM SERPL-SCNC: 5.2 MMOL/L (ref 3.5–5.1)
PROSTATE SPECIFIC ANTIGEN: 5.3 NG/ML (ref 0–4)
RBC # BLD: 4.86 M/UL (ref 4.2–5.9)
SODIUM BLD-SCNC: 143 MMOL/L (ref 136–145)
TOTAL PROTEIN: 6.7 G/DL (ref 6.4–8.2)
TRIGL SERPL-MCNC: 131 MG/DL (ref 0–150)
TSH REFLEX: 1.3 UIU/ML (ref 0.27–4.2)
VLDLC SERPL CALC-MCNC: 26 MG/DL
WBC # BLD: 6.3 K/UL (ref 4–11)

## 2018-04-02 PROCEDURE — G8417 CALC BMI ABV UP PARAM F/U: HCPCS | Performed by: INTERNAL MEDICINE

## 2018-04-02 PROCEDURE — 1123F ACP DISCUSS/DSCN MKR DOCD: CPT | Performed by: INTERNAL MEDICINE

## 2018-04-02 PROCEDURE — 3046F HEMOGLOBIN A1C LEVEL >9.0%: CPT | Performed by: INTERNAL MEDICINE

## 2018-04-02 PROCEDURE — 4040F PNEUMOC VAC/ADMIN/RCVD: CPT | Performed by: INTERNAL MEDICINE

## 2018-04-02 PROCEDURE — G8427 DOCREV CUR MEDS BY ELIG CLIN: HCPCS | Performed by: INTERNAL MEDICINE

## 2018-04-02 PROCEDURE — 3017F COLORECTAL CA SCREEN DOC REV: CPT | Performed by: INTERNAL MEDICINE

## 2018-04-02 PROCEDURE — 99214 OFFICE O/P EST MOD 30 MIN: CPT | Performed by: INTERNAL MEDICINE

## 2018-04-02 PROCEDURE — 1036F TOBACCO NON-USER: CPT | Performed by: INTERNAL MEDICINE

## 2018-04-02 RX ORDER — PIOGLITAZONEHYDROCHLORIDE 15 MG/1
15 TABLET ORAL DAILY
COMMUNITY
Start: 2017-12-30 | End: 2021-03-02

## 2018-04-02 RX ORDER — LANOLIN ALCOHOL/MO/W.PET/CERES
1000 CREAM (GRAM) TOPICAL DAILY
COMMUNITY
End: 2019-10-29

## 2018-04-02 ASSESSMENT — ENCOUNTER SYMPTOMS
EYES NEGATIVE: 1
APNEA: 1
GASTROINTESTINAL NEGATIVE: 1
ALLERGIC/IMMUNOLOGIC NEGATIVE: 1

## 2018-04-03 LAB
ESTIMATED AVERAGE GLUCOSE: 137 MG/DL
HBA1C MFR BLD: 6.4 %

## 2018-04-07 DIAGNOSIS — R29.898 WEAKNESS OF BOTH LOWER EXTREMITIES: Primary | ICD-10-CM

## 2018-04-09 RX ORDER — GABAPENTIN 300 MG/1
CAPSULE ORAL
Qty: 270 CAPSULE | Refills: 0 | OUTPATIENT
Start: 2018-04-09 | End: 2018-07-05 | Stop reason: SDUPTHER

## 2018-04-13 RX ORDER — DICLOFENAC SODIUM 75 MG/1
TABLET, DELAYED RELEASE ORAL
Qty: 180 TABLET | Refills: 1 | Status: SHIPPED | OUTPATIENT
Start: 2018-04-13 | End: 2018-10-06 | Stop reason: SDUPTHER

## 2018-04-13 RX ORDER — ALLOPURINOL 300 MG/1
TABLET ORAL
Qty: 90 TABLET | Refills: 1 | Status: SHIPPED | OUTPATIENT
Start: 2018-04-13 | End: 2018-10-06 | Stop reason: SDUPTHER

## 2018-06-20 RX ORDER — ATORVASTATIN CALCIUM 40 MG/1
TABLET, FILM COATED ORAL
Qty: 90 TABLET | Refills: 0 | Status: SHIPPED | OUTPATIENT
Start: 2018-06-20 | End: 2018-09-15 | Stop reason: SDUPTHER

## 2018-06-21 RX ORDER — OMEPRAZOLE 40 MG/1
CAPSULE, DELAYED RELEASE ORAL
Qty: 90 CAPSULE | Refills: 1 | Status: SHIPPED | OUTPATIENT
Start: 2018-06-21 | End: 2018-12-14 | Stop reason: SDUPTHER

## 2018-07-07 DIAGNOSIS — R29.898 WEAKNESS OF BOTH LOWER EXTREMITIES: ICD-10-CM

## 2018-07-09 RX ORDER — GABAPENTIN 300 MG/1
CAPSULE ORAL
Qty: 270 CAPSULE | Refills: 0 | OUTPATIENT
Start: 2018-07-09 | End: 2018-08-08

## 2018-09-15 RX ORDER — ATORVASTATIN CALCIUM 40 MG/1
TABLET, FILM COATED ORAL
Qty: 90 TABLET | Refills: 0 | Status: SHIPPED | OUTPATIENT
Start: 2018-09-15 | End: 2018-12-10 | Stop reason: SDUPTHER

## 2018-10-08 RX ORDER — DICLOFENAC SODIUM 75 MG/1
TABLET, DELAYED RELEASE ORAL
Qty: 180 TABLET | Refills: 2 | Status: SHIPPED | OUTPATIENT
Start: 2018-10-08 | End: 2019-09-25 | Stop reason: SDUPTHER

## 2018-10-08 RX ORDER — ALLOPURINOL 300 MG/1
TABLET ORAL
Qty: 90 TABLET | Refills: 2 | Status: SHIPPED | OUTPATIENT
Start: 2018-10-08 | End: 2019-07-05 | Stop reason: SDUPTHER

## 2018-10-23 ENCOUNTER — OFFICE VISIT (OUTPATIENT)
Dept: INTERNAL MEDICINE CLINIC | Age: 68
End: 2018-10-23
Payer: MEDICARE

## 2018-10-23 VITALS
HEART RATE: 64 BPM | SYSTOLIC BLOOD PRESSURE: 132 MMHG | BODY MASS INDEX: 42.66 KG/M2 | RESPIRATION RATE: 12 BRPM | HEIGHT: 72 IN | WEIGHT: 315 LBS | DIASTOLIC BLOOD PRESSURE: 82 MMHG

## 2018-10-23 DIAGNOSIS — R29.898 WEAKNESS OF BOTH LOWER EXTREMITIES: ICD-10-CM

## 2018-10-23 DIAGNOSIS — E11.9 TYPE 2 DIABETES MELLITUS WITHOUT COMPLICATION, WITH LONG-TERM CURRENT USE OF INSULIN (HCC): ICD-10-CM

## 2018-10-23 DIAGNOSIS — Z79.4 TYPE 2 DIABETES MELLITUS WITHOUT COMPLICATION, WITH LONG-TERM CURRENT USE OF INSULIN (HCC): ICD-10-CM

## 2018-10-23 DIAGNOSIS — Z85.46 PERSONAL HISTORY OF MALIGNANT NEOPLASM OF PROSTATE: ICD-10-CM

## 2018-10-23 DIAGNOSIS — E78.2 MIXED HYPERLIPIDEMIA: ICD-10-CM

## 2018-10-23 DIAGNOSIS — Z12.11 ENCOUNTER FOR SCREENING COLONOSCOPY: ICD-10-CM

## 2018-10-23 DIAGNOSIS — K21.9 GASTROESOPHAGEAL REFLUX DISEASE WITHOUT ESOPHAGITIS: ICD-10-CM

## 2018-10-23 DIAGNOSIS — I10 ESSENTIAL HYPERTENSION: ICD-10-CM

## 2018-10-23 DIAGNOSIS — E66.01 MORBID OBESITY (HCC): ICD-10-CM

## 2018-10-23 DIAGNOSIS — Z00.00 WELL ADULT EXAM: Primary | ICD-10-CM

## 2018-10-23 DIAGNOSIS — F17.200 TOBACCO USE DISORDER: ICD-10-CM

## 2018-10-23 DIAGNOSIS — G47.30 SLEEP APNEA, UNSPECIFIED TYPE: ICD-10-CM

## 2018-10-23 DIAGNOSIS — Z00.00 ROUTINE GENERAL MEDICAL EXAMINATION AT A HEALTH CARE FACILITY: ICD-10-CM

## 2018-10-23 PROCEDURE — 3044F HG A1C LEVEL LT 7.0%: CPT | Performed by: INTERNAL MEDICINE

## 2018-10-23 PROCEDURE — 1036F TOBACCO NON-USER: CPT | Performed by: INTERNAL MEDICINE

## 2018-10-23 PROCEDURE — G0439 PPPS, SUBSEQ VISIT: HCPCS | Performed by: INTERNAL MEDICINE

## 2018-10-23 PROCEDURE — 1101F PT FALLS ASSESS-DOCD LE1/YR: CPT | Performed by: INTERNAL MEDICINE

## 2018-10-23 PROCEDURE — G8482 FLU IMMUNIZE ORDER/ADMIN: HCPCS | Performed by: INTERNAL MEDICINE

## 2018-10-23 PROCEDURE — 2022F DILAT RTA XM EVC RTNOPTHY: CPT | Performed by: INTERNAL MEDICINE

## 2018-10-23 PROCEDURE — 3017F COLORECTAL CA SCREEN DOC REV: CPT | Performed by: INTERNAL MEDICINE

## 2018-10-23 PROCEDURE — G8417 CALC BMI ABV UP PARAM F/U: HCPCS | Performed by: INTERNAL MEDICINE

## 2018-10-23 PROCEDURE — 99214 OFFICE O/P EST MOD 30 MIN: CPT | Performed by: INTERNAL MEDICINE

## 2018-10-23 PROCEDURE — G8427 DOCREV CUR MEDS BY ELIG CLIN: HCPCS | Performed by: INTERNAL MEDICINE

## 2018-10-23 PROCEDURE — 1123F ACP DISCUSS/DSCN MKR DOCD: CPT | Performed by: INTERNAL MEDICINE

## 2018-10-23 PROCEDURE — 4040F PNEUMOC VAC/ADMIN/RCVD: CPT | Performed by: INTERNAL MEDICINE

## 2018-10-23 ASSESSMENT — PATIENT HEALTH QUESTIONNAIRE - PHQ9
SUM OF ALL RESPONSES TO PHQ QUESTIONS 1-9: 1
SUM OF ALL RESPONSES TO PHQ QUESTIONS 1-9: 1

## 2018-10-23 ASSESSMENT — ENCOUNTER SYMPTOMS
APNEA: 1
EYES NEGATIVE: 1
GASTROINTESTINAL NEGATIVE: 1
ALLERGIC/IMMUNOLOGIC NEGATIVE: 1

## 2018-10-23 ASSESSMENT — ANXIETY QUESTIONNAIRES: GAD7 TOTAL SCORE: 0

## 2018-10-23 ASSESSMENT — LIFESTYLE VARIABLES: HOW OFTEN DO YOU HAVE A DRINK CONTAINING ALCOHOL: 0

## 2018-10-23 NOTE — PATIENT INSTRUCTIONS
Personalized Preventive Plan for Merari Combs - 10/23/2018  Medicare offers a range of preventive health benefits. Some of the tests and screenings are paid in full while other may be subject to a deductible, co-insurance, and/or copay. Some of these benefits include a comprehensive review of your medical history including lifestyle, illnesses that may run in your family, and various assessments and screenings as appropriate. After reviewing your medical record and screening and assessments performed today your provider may have ordered immunizations, labs, imaging, and/or referrals for you. A list of these orders (if applicable) as well as your Preventive Care list are included within your After Visit Summary for your review. Other Preventive Recommendations:    · A preventive eye exam performed by an eye specialist is recommended every 1-2 years to screen for glaucoma; cataracts, macular degeneration, and other eye disorders. · A preventive dental visit is recommended every 6 months. · Try to get at least 150 minutes of exercise per week or 10,000 steps per day on a pedometer . · Order or download the FREE \"Exercise & Physical Activity: Your Everyday Guide\" from [employer]. Call 6-288.912.9750 or search [employer] online. · You need 2429-0190 mg of calcium and 4459-4353 IU of vitamin D per day. It is possible to meet your calcium requirement with diet alone, but a vitamin D supplement is usually necessary to meet this goal.  · When exposed to the sun, use a sunscreen that protects against both UVA and UVB radiation with an SPF of 30 or greater. Reapply every 2 to 3 hours or after sweating, drying off with a towel, or swimming. · Always wear a seat belt when traveling in a car. Always wear a helmet when riding a bicycle or motorcycle.

## 2018-10-23 NOTE — PROGRESS NOTES
Subjective:      Patient ID: Jhoan Maldonado is a 77 y.o. male. HPI  Here today for medicare complete physical and review of chronic problems as listed under assessment and plan,no new c/o feels goods        Diabetes   He presents for his follow-up diabetic visit. He has type 2 diabetes mellitus. His disease course has been stable. Hypoglycemia symptoms include nervousness/anxiousness and sweats. Associated symptoms include foot paresthesias. Pertinent negatives for diabetes include no fatigue. There are no hypoglycemic complications. Diabetic complications include peripheral neuropathy. Pertinent negatives for diabetic complications include no retinopathy. Risk factors for coronary artery disease include diabetes mellitus, dyslipidemia, hypertension, male sex and obesity. Current diabetic treatment includes insulin injections, intensive insulin program and oral agent (dual therapy). He is compliant with treatment all of the time. His weight is increasing rapidly. He is following a low fat/cholesterol and low salt diet. Meal planning includes carbohydrate counting. He has had a previous visit with a dietitian. He participates in exercise intermittently. His home blood glucose trend is fluctuating minimally. An ACE inhibitor/angiotensin II receptor blocker is being taken. Eye exam is current. Hyperlipidemia   This is a chronic problem. The current episode started more than 1 year ago. The problem is controlled. Recent lipid tests were reviewed and are normal. Exacerbating diseases include diabetes and obesity. Factors aggravating his hyperlipidemia include smoking. Current antihyperlipidemic treatment includes diet change, exercise and statins. The current treatment provides significant improvement of lipids. There are no compliance problems. Risk factors for coronary artery disease include diabetes mellitus, dyslipidemia, hypertension, male sex and obesity. Hypertension   This is a chronic problem.  The current Musculoskeletal: Positive for arthralgias (knee c/o ). Skin: Negative. Allergic/Immunologic: Negative. Neurological: Negative. Negative for numbness. Hematological: Negative. Psychiatric/Behavioral: The patient is nervous/anxious. Objective:   Physical Exam:  Physical Exam   Constitutional: He is oriented to person, place, and time. He appears well-developed and well-nourished. Obese   HENT:   Head: Normocephalic and atraumatic. Right Ear: External ear normal.   Left Ear: External ear normal.   Nose: Nose normal.   Mouth/Throat: Oropharynx is clear and moist.   Eyes: Pupils are equal, round, and reactive to light. Conjunctivae and EOM are normal.   Neck: Normal range of motion. Neck supple. No tracheal deviation present. No thyromegaly present. Cardiovascular: Normal rate, regular rhythm, normal heart sounds and intact distal pulses. No murmur heard. Pulmonary/Chest: Effort normal and breath sounds normal.   Abdominal: Soft. Bowel sounds are normal.   obese   Genitourinary:   Genitourinary Comments: NA as per Dr Yvette Martell    Musculoskeletal: Normal range of motion. Tenderness: knees improved    Neurological: He is alert and oriented to person, place, and time. He has normal reflexes. DIABETIC FOOT EXAM:   normal DP and PT pulses, no trophic changes or ulcerative lesions and normal sensory exam     Skin: Skin is warm and dry. Mult keratotic lesions of legs    Psychiatric: He has a normal mood and affect. His behavior is normal. Thought content normal.       /82 (Site: Right Upper Arm, Position: Sitting, Cuff Size: Medium Adult)   Pulse 64   Resp 12   Ht 6' (1.829 m)   Wt (!) 371 lb (168.3 kg)   BMI 50.32 kg/m²       Assessment & Plan:       Essential hypertension  Stable no change in meds return in 3 mo. Labs pending     GERD (gastroesophageal reflux disease)  Controled with diet and meds     Mixed hyperlipidemia  Stable no change in meds return in 3 mo.  Labs pending acids (FISH OIL) 1000 MG capsule Take 1 g by mouth daily       aspirin 81 MG EC tablet Take 81 mg by mouth daily.  B-D ULTRA FINE LANCETS MISC by Does not apply route. 29 g x12mm       Blood Glucose Monitoring Suppl (ONE TOUCH ULTRA) by Does not apply route. Test strips      vitamin B-12 (CYANOCOBALAMIN) 1000 MCG tablet Take 1,000 mcg by mouth daily       No current facility-administered medications for this visit. Vitals:    10/23/18 0857   BP: 132/82   Site: Right Upper Arm   Position: Sitting   Cuff Size: Medium Adult   Pulse: 64   Resp: 12   Weight: (!) 371 lb (168.3 kg)   Height: 6' (1.829 m)     Body mass index is 50.32 kg/m². Wt Readings from Last 3 Encounters:   10/23/18 (!) 371 lb (168.3 kg)   04/02/18 (!) 353 lb 6.4 oz (160.3 kg)   10/04/17 (!) 341 lb 12.8 oz (155 kg)     BP Readings from Last 3 Encounters:   10/23/18 132/82   04/02/18 120/70   10/04/17 126/76       Consultants:   Patient Care Team:  Iman Alvarado MD as PCP - General (Internal Medicine)  Iman Alvarado MD as PCP - S Attributed Provider  Jose Speaker. Berta Luque (Inactive) as Consulting Physician (Endocrinology)  Ryan Rubio as Consulting Physician (Ophthalmology)    Chief Complaint:   Berta Chisholm is a 77 y.o. male who presents for Medicare Preventive Physical Examination with Personalized Prevention Plan Services.     HPI: Tr review listed chronic problems     Patient Active Problem List   Diagnosis    Type 2 diabetes mellitus without complication ([facility]    Morbid obesity ([facility]    Essential hypertension    Mixed hyperlipidemia    Personal history of malignant neoplasm of prostate    Sleep apnea    Tobacco use disorder    Well adult exam    Weakness of both lower extremities    GERD (gastroesophageal reflux disease)       Mood Disorders Screen:  Risk factors: none  Symptoms:  endorses none, denies depressed mood, difficulty concentrating, difficulty sleeping, reduced interest in activities and changes

## 2018-10-26 ENCOUNTER — TELEPHONE (OUTPATIENT)
Dept: CASE MANAGEMENT | Age: 68
End: 2018-10-26

## 2018-12-10 RX ORDER — ATORVASTATIN CALCIUM 40 MG/1
TABLET, FILM COATED ORAL
Qty: 90 TABLET | Refills: 0 | Status: SHIPPED | OUTPATIENT
Start: 2018-12-10 | End: 2019-03-07 | Stop reason: SDUPTHER

## 2018-12-14 RX ORDER — OMEPRAZOLE 40 MG/1
CAPSULE, DELAYED RELEASE ORAL
Qty: 90 CAPSULE | Refills: 1 | Status: SHIPPED | OUTPATIENT
Start: 2018-12-14 | End: 2019-06-12 | Stop reason: SDUPTHER

## 2018-12-30 DIAGNOSIS — R29.898 WEAKNESS OF BOTH LOWER EXTREMITIES: ICD-10-CM

## 2018-12-31 RX ORDER — GABAPENTIN 300 MG/1
CAPSULE ORAL
Qty: 270 CAPSULE | Refills: 1 | Status: SHIPPED | OUTPATIENT
Start: 2018-12-31 | End: 2020-10-20

## 2019-03-07 RX ORDER — ATORVASTATIN CALCIUM 40 MG/1
TABLET, FILM COATED ORAL
Qty: 90 TABLET | Refills: 1 | Status: SHIPPED | OUTPATIENT
Start: 2019-03-07 | End: 2019-08-31 | Stop reason: SDUPTHER

## 2019-04-08 ENCOUNTER — TELEPHONE (OUTPATIENT)
Dept: CASE MANAGEMENT | Age: 69
End: 2019-04-08

## 2019-04-23 ENCOUNTER — OFFICE VISIT (OUTPATIENT)
Dept: INTERNAL MEDICINE CLINIC | Age: 69
End: 2019-04-23
Payer: MEDICARE

## 2019-04-23 VITALS
HEIGHT: 72 IN | HEART RATE: 78 BPM | SYSTOLIC BLOOD PRESSURE: 132 MMHG | WEIGHT: 315 LBS | DIASTOLIC BLOOD PRESSURE: 74 MMHG | BODY MASS INDEX: 42.66 KG/M2 | RESPIRATION RATE: 16 BRPM

## 2019-04-23 DIAGNOSIS — R29.898 WEAKNESS OF BOTH LOWER EXTREMITIES: ICD-10-CM

## 2019-04-23 DIAGNOSIS — K21.9 GASTROESOPHAGEAL REFLUX DISEASE WITHOUT ESOPHAGITIS: ICD-10-CM

## 2019-04-23 DIAGNOSIS — G47.30 SLEEP APNEA, UNSPECIFIED TYPE: ICD-10-CM

## 2019-04-23 DIAGNOSIS — Z79.4 TYPE 2 DIABETES MELLITUS WITHOUT COMPLICATION, WITH LONG-TERM CURRENT USE OF INSULIN (HCC): ICD-10-CM

## 2019-04-23 DIAGNOSIS — F17.200 TOBACCO USE DISORDER: ICD-10-CM

## 2019-04-23 DIAGNOSIS — E11.9 TYPE 2 DIABETES MELLITUS WITHOUT COMPLICATION, WITH LONG-TERM CURRENT USE OF INSULIN (HCC): ICD-10-CM

## 2019-04-23 DIAGNOSIS — I10 ESSENTIAL HYPERTENSION: Primary | ICD-10-CM

## 2019-04-23 DIAGNOSIS — I10 ESSENTIAL HYPERTENSION: ICD-10-CM

## 2019-04-23 DIAGNOSIS — Z12.11 ENCOUNTER FOR SCREENING COLONOSCOPY: ICD-10-CM

## 2019-04-23 DIAGNOSIS — E78.2 MIXED HYPERLIPIDEMIA: ICD-10-CM

## 2019-04-23 DIAGNOSIS — Z85.46 PERSONAL HISTORY OF MALIGNANT NEOPLASM OF PROSTATE: ICD-10-CM

## 2019-04-23 DIAGNOSIS — E66.01 MORBID OBESITY (HCC): ICD-10-CM

## 2019-04-23 LAB
A/G RATIO: 1.7 (ref 1.1–2.2)
ALBUMIN SERPL-MCNC: 4.3 G/DL (ref 3.4–5)
ALP BLD-CCNC: 127 U/L (ref 40–129)
ALT SERPL-CCNC: 20 U/L (ref 10–40)
ANION GAP SERPL CALCULATED.3IONS-SCNC: 16 MMOL/L (ref 3–16)
AST SERPL-CCNC: 16 U/L (ref 15–37)
BASOPHILS ABSOLUTE: 0.1 K/UL (ref 0–0.2)
BASOPHILS RELATIVE PERCENT: 1.1 %
BILIRUB SERPL-MCNC: 0.3 MG/DL (ref 0–1)
BUN BLDV-MCNC: 28 MG/DL (ref 7–20)
CALCIUM SERPL-MCNC: 9.8 MG/DL (ref 8.3–10.6)
CHLORIDE BLD-SCNC: 102 MMOL/L (ref 99–110)
CHOLESTEROL, TOTAL: 150 MG/DL (ref 0–199)
CO2: 25 MMOL/L (ref 21–32)
CREAT SERPL-MCNC: 1 MG/DL (ref 0.8–1.3)
EOSINOPHILS ABSOLUTE: 0.3 K/UL (ref 0–0.6)
EOSINOPHILS RELATIVE PERCENT: 4.9 %
GFR AFRICAN AMERICAN: >60
GFR NON-AFRICAN AMERICAN: >60
GLOBULIN: 2.6 G/DL
GLUCOSE BLD-MCNC: 112 MG/DL (ref 70–99)
HCT VFR BLD CALC: 43 % (ref 40.5–52.5)
HDLC SERPL-MCNC: 36 MG/DL (ref 40–60)
HEMOGLOBIN: 14.1 G/DL (ref 13.5–17.5)
LDL CHOLESTEROL CALCULATED: 74 MG/DL
LYMPHOCYTES ABSOLUTE: 1.4 K/UL (ref 1–5.1)
LYMPHOCYTES RELATIVE PERCENT: 22.1 %
MCH RBC QN AUTO: 28.4 PG (ref 26–34)
MCHC RBC AUTO-ENTMCNC: 32.7 G/DL (ref 31–36)
MCV RBC AUTO: 86.9 FL (ref 80–100)
MONOCYTES ABSOLUTE: 0.5 K/UL (ref 0–1.3)
MONOCYTES RELATIVE PERCENT: 7.9 %
NEUTROPHILS ABSOLUTE: 4 K/UL (ref 1.7–7.7)
NEUTROPHILS RELATIVE PERCENT: 64 %
PDW BLD-RTO: 17.2 % (ref 12.4–15.4)
PLATELET # BLD: 265 K/UL (ref 135–450)
PMV BLD AUTO: 8.1 FL (ref 5–10.5)
POTASSIUM SERPL-SCNC: 5.3 MMOL/L (ref 3.5–5.1)
PROSTATE SPECIFIC ANTIGEN: 6.33 NG/ML (ref 0–4)
RBC # BLD: 4.95 M/UL (ref 4.2–5.9)
SODIUM BLD-SCNC: 143 MMOL/L (ref 136–145)
TOTAL PROTEIN: 6.9 G/DL (ref 6.4–8.2)
TRIGL SERPL-MCNC: 200 MG/DL (ref 0–150)
TSH REFLEX: 1.82 UIU/ML (ref 0.27–4.2)
VLDLC SERPL CALC-MCNC: 40 MG/DL
WBC # BLD: 6.2 K/UL (ref 4–11)

## 2019-04-23 PROCEDURE — 1036F TOBACCO NON-USER: CPT | Performed by: INTERNAL MEDICINE

## 2019-04-23 PROCEDURE — 99214 OFFICE O/P EST MOD 30 MIN: CPT | Performed by: INTERNAL MEDICINE

## 2019-04-23 PROCEDURE — G8427 DOCREV CUR MEDS BY ELIG CLIN: HCPCS | Performed by: INTERNAL MEDICINE

## 2019-04-23 PROCEDURE — 2022F DILAT RTA XM EVC RTNOPTHY: CPT | Performed by: INTERNAL MEDICINE

## 2019-04-23 PROCEDURE — 1123F ACP DISCUSS/DSCN MKR DOCD: CPT | Performed by: INTERNAL MEDICINE

## 2019-04-23 PROCEDURE — 3046F HEMOGLOBIN A1C LEVEL >9.0%: CPT | Performed by: INTERNAL MEDICINE

## 2019-04-23 PROCEDURE — 4040F PNEUMOC VAC/ADMIN/RCVD: CPT | Performed by: INTERNAL MEDICINE

## 2019-04-23 PROCEDURE — 3017F COLORECTAL CA SCREEN DOC REV: CPT | Performed by: INTERNAL MEDICINE

## 2019-04-23 PROCEDURE — G8417 CALC BMI ABV UP PARAM F/U: HCPCS | Performed by: INTERNAL MEDICINE

## 2019-04-23 RX ORDER — GABAPENTIN 300 MG/1
300 CAPSULE ORAL DAILY
COMMUNITY
End: 2019-10-29 | Stop reason: DRUGHIGH

## 2019-04-23 ASSESSMENT — ENCOUNTER SYMPTOMS
EYES NEGATIVE: 1
APNEA: 1
GASTROINTESTINAL NEGATIVE: 1
ALLERGIC/IMMUNOLOGIC NEGATIVE: 1

## 2019-04-23 ASSESSMENT — PATIENT HEALTH QUESTIONNAIRE - PHQ9
SUM OF ALL RESPONSES TO PHQ QUESTIONS 1-9: 0
1. LITTLE INTEREST OR PLEASURE IN DOING THINGS: 0
2. FEELING DOWN, DEPRESSED OR HOPELESS: 0
SUM OF ALL RESPONSES TO PHQ9 QUESTIONS 1 & 2: 0
SUM OF ALL RESPONSES TO PHQ QUESTIONS 1-9: 0

## 2019-04-23 NOTE — PROGRESS NOTES
Subjective:      Patient ID: María Ramirez is a 77 y.o. male. HPI  Here today for follow up of chronic problems as per HPI and as problems listed under assessment and plan,no new c/o feels good cont to smoke and not exercise     Diabetes   He presents for his follow-up diabetic visit. He has type 2 diabetes mellitus. His disease course has been improving. Associated symptoms include foot paresthesias. Pertinent negatives for diabetes include no fatigue. Symptoms are stable. Diabetic complications include peripheral neuropathy and retinopathy. Risk factors for coronary artery disease include diabetes mellitus, dyslipidemia, hypertension, male sex, obesity and tobacco exposure. Current diabetic treatment includes diet, insulin injections and oral agent (dual therapy) (Charles;icty 1/week ). He is compliant with treatment all of the time. His weight is fluctuating minimally. He is following a diabetic, low fat/cholesterol and low salt diet. He never participates in exercise. His home blood glucose trend is fluctuating minimally. His breakfast blood glucose is taken between 7-8 am. His breakfast blood glucose range is generally 110-130 mg/dl. An ACE inhibitor/angiotensin II receptor blocker is being taken. Eye exam is current. Hyperlipidemia   This is a chronic problem. The current episode started more than 1 year ago. The problem is controlled. Recent lipid tests were reviewed and are low. Exacerbating diseases include diabetes. Current antihyperlipidemic treatment includes diet change and statins. The current treatment provides significant improvement of lipids. Compliance problems include adherence to exercise. Risk factors for coronary artery disease include diabetes mellitus, dyslipidemia, hypertension, male sex and obesity. Hypertension   This is a chronic problem. The current episode started more than 1 year ago. The problem is unchanged. The problem is controlled. Associated symptoms include anxiety.  There are no associated agents to hypertension. Risk factors for coronary artery disease include diabetes mellitus, dyslipidemia, male gender, obesity and smoking/tobacco exposure. Past treatments include ACE inhibitors. The current treatment provides significant improvement. There are no compliance problems. Hypertensive end-organ damage includes retinopathy. There is no history of hypercortisolism. No Known Allergies    Current Outpatient Medications   Medication Sig Dispense Refill    gabapentin (NEURONTIN) 300 MG capsule Take 300 mg by mouth 3 times daily.  atorvastatin (LIPITOR) 40 MG tablet TAKE ONE TABLET BY MOUTH DAILY 90 tablet 1    gabapentin (NEURONTIN) 300 MG capsule TAKE ONE CAPSULE BY MOUTH THREE TIMES A  capsule 1    omeprazole (PRILOSEC) 40 MG delayed release capsule TAKE ONE CAPSULE BY MOUTH DAILY 90 capsule 1    insulin glargine (TOUJEO SOLOSTAR) 300 UNIT/ML injection pen Inject 20 Units into the skin nightly       metFORMIN (GLUCOPHAGE) 500 MG tablet TAKE TWO TABLETS BY MOUTH TWICE A  tablet 2    diclofenac (VOLTAREN) 75 MG EC tablet TAKE ONE TABLET BY MOUTH TWICE A  tablet 2    allopurinol (ZYLOPRIM) 300 MG tablet TAKE ONE TABLET BY MOUTH DAILY 90 tablet 2    pioglitazone (ACTOS) 15 MG tablet Take 15 mg by mouth daily      Cholecalciferol (VITAMIN D3) 1000 units CAPS Take by mouth 2 times daily      vitamin B-12 (CYANOCOBALAMIN) 1000 MCG tablet Take 1,000 mcg by mouth daily      lisinopril (PRINIVIL;ZESTRIL) 30 MG tablet Take 1 tablet by mouth daily      Dulaglutide 0.75 MG/0.5ML SOPN Inject 1.5 mg into the skin once a week 12 Pen 1    fish oil-omega-3 fatty acids (FISH OIL) 1000 MG capsule Take 1 g by mouth daily       aspirin 81 MG EC tablet Take 81 mg by mouth daily.  B-D ULTRA FINE LANCETS MISC by Does not apply route. 29 g x12mm       Blood Glucose Monitoring Suppl (ONE TOUCH ULTRA) by Does not apply route.  Test strips       No current facility-administered medications for this visit.         Past Medical History:   Diagnosis Date    Cancer [facility]) 2007    Prostate Tx with rads Dr Sheila Barnett Hyperlipidemia     Type II or unspecified type diabetes mellitus without mention of complication, not stated as uncontrolled     now seeing Dr Arely Bosch       Family History   Problem Relation Age of Onset    Coronary Art Dis Father     Heart Disease Father     Diabetes Brother        Past Surgical History:   Procedure Laterality Date    APPENDECTOMY      COLONOSCOPY  1998    normal     PROSTATE SURGERY  2007    positive biopsy for cancer       Social History     Socioeconomic History    Marital status: Single     Spouse name: Not on file    Number of children: Not on file    Years of education: Not on file    Highest education level: Not on file   Occupational History    Not on file   Social Needs    Financial resource strain: Not on file    Food insecurity:     Worry: Not on file     Inability: Not on file    Transportation needs:     Medical: Not on file     Non-medical: Not on file   Tobacco Use    Smoking status: Former Smoker     Packs/day: 2.00     Years: 100.00     Pack years: 200.00     Start date: 1965     Last attempt to quit: 2018     Years since quittin.1    Smokeless tobacco: Never Used    Tobacco comment: has been trying to  try gum or patch -to try Chantix stopped then started again    Substance and Sexual Activity    Alcohol use: No    Drug use: No    Sexual activity: Not on file   Lifestyle    Physical activity:     Days per week: Not on file     Minutes per session: Not on file    Stress: Not on file   Relationships    Social connections:     Talks on phone: Not on file     Gets together: Not on file     Attends Advent service: Not on file     Active member of club or organization: Not on file     Attends meetings of clubs or organizations: Not on file     Relationship status: Not on file    Intimate partner violence:     Fear of current or ex partner: Not on file     Emotionally abused: Not on file     Physically abused: Not on file     Forced sexual activity: Not on file   Other Topics Concern    Not on file   Social History Narrative    Not on file       Review of Systems  Review of Systems   Constitutional: Positive for unexpected weight change (up a few # ). Negative for fatigue. HENT: Negative. Has abused Afrin for years off now    Eyes: Negative. Last exam was ok    Respiratory: Positive for apnea. Cont to use C-pap   Has DC smoking as of 2/14/18  had neg. CT as noted  Has started smoking again 10/18    Cardiovascular: Negative. Gastrointestinal: Negative. Needs 10 year F/U with colonoscopy -refuses to get it -will do  cologuard    Endocrine: Negative. Stable diabetic control    Genitourinary: Positive for frequency and urgency. Cont with Dr Malina Anne -has not seen him lately   Hx of renal colic none since being on allopurinol  1995  S/P Prostate CA 2007 was Tx with rads -PSA increase again to see Dr Malina Anne  ?? He will do this -check PSA again still has not seen Dr Moya Crease: Positive for arthralgias (knee c/o ). Skin: Negative. Allergic/Immunologic: Negative. Neurological: Negative. Negative for numbness. Hematological: Negative. Objective:   Physical Exam:  Physical Exam   Constitutional: He is oriented to person, place, and time. He appears well-developed and well-nourished. Obese   HENT:   Head: Normocephalic and atraumatic. Nose: Nose normal.   Mouth/Throat: Oropharynx is clear and moist.   Eyes: Pupils are equal, round, and reactive to light. Conjunctivae and EOM are normal.   Neck: Normal range of motion. Neck supple. No tracheal deviation present. No thyromegaly present. Cardiovascular: Normal rate, regular rhythm, normal heart sounds and intact distal pulses. No murmur heard.   Pulmonary/Chest: Effort normal and breath sounds normal.   Abdominal:   obese   Musculoskeletal: Normal range of motion. Tenderness: knees improved    Neurological: He is alert and oriented to person, place, and time. He has normal reflexes. Skin: Skin is warm and dry. Mult keratotic lesions of legs    Psychiatric: He has a normal mood and affect. His behavior is normal. Thought content normal.       /74 (Site: Right Upper Arm, Position: Sitting, Cuff Size: Medium Adult)   Pulse 78   Resp 16   Ht 6' (1.829 m)   Wt (!) 362 lb 12.8 oz (164.6 kg)   BMI 49.20 kg/m²       Assessment & Plan:     Type 2 diabetes mellitus without complication (HCC)  Cont control  meds as noted labs pending return in 6 mo for CPE    Tobacco use disorder  Cont to smoke needs to DC counseled again     Morbid obesity (HCC)  Down a few # cont to diet etc     Essential hypertension  . Stable no change in meds return in 6mo. Labs pending     Mixed hyperlipidemia  Stable no change in meds return in 6 mo. Labs pending     Personal history of malignant neoplasm of prostate  Check PSA still has not seen Dr Alejandrina Leos no new symptoms     Sleep apnea  Cont to use C-pap     Weakness of both lower extremities  Cont to be a problem despite weight lose     GERD (gastroesophageal reflux disease)  Cont.  With diet and meds

## 2019-04-24 ENCOUNTER — TELEPHONE (OUTPATIENT)
Dept: INTERNAL MEDICINE CLINIC | Age: 69
End: 2019-04-24

## 2019-04-24 LAB
ESTIMATED AVERAGE GLUCOSE: 165.7 MG/DL
HBA1C MFR BLD: 7.4 %

## 2019-04-24 NOTE — TELEPHONE ENCOUNTER
System-Generated [place] MD             This message has been sent because your session was closed due to inactivity. The content of the result note you were working on is listed below.      Selected Orders:     CBC WITH AUTO DIFFERENTIAL [[id number]]     LIPID PANEL [[id number]]     COMPREHENSIVE METABOLIC PANEL [[id number]]     PSA PROSTATIC SPECIFIC ANTIGEN [[id number]]     TSH WITH REFLEX [[id number]]     HEMOGLOBIN A1C [[id number]]     P MHC[facility] [place] [address] PRACTICE SUPPORT [[id number]]     === === Text of the result note when timeout occurred === ===     Labs all ok no change in meds - HgA1C is increased  to 7.4 need tighter control of diet and adjust meds as per Endo still need to DC smoking

## 2019-04-25 ENCOUNTER — TELEPHONE (OUTPATIENT)
Dept: INTERNAL MEDICINE CLINIC | Age: 69
End: 2019-04-25

## 2019-04-25 NOTE — TELEPHONE ENCOUNTER
Results discussed with patient. Patient used to see Dr. Jorge Guillen in the past he will make sure he follows up.

## 2019-05-10 ENCOUNTER — TELEPHONE (OUTPATIENT)
Dept: INTERNAL MEDICINE CLINIC | Age: 69
End: 2019-05-10

## 2019-05-10 RX ORDER — FENOFIBRATE 145 MG/1
145 TABLET, COATED ORAL DAILY
COMMUNITY
Start: 2019-05-10 | End: 2019-10-29

## 2019-05-10 NOTE — TELEPHONE ENCOUNTER
Patient called stating his endocrinologist prescribed him Fenofibrate and he wants to make sure it is ok take with all of his other medicine.      Please call to advise

## 2019-06-12 RX ORDER — OMEPRAZOLE 40 MG/1
CAPSULE, DELAYED RELEASE ORAL
Qty: 90 CAPSULE | Refills: 0 | Status: SHIPPED | OUTPATIENT
Start: 2019-06-12 | End: 2019-09-07 | Stop reason: SDUPTHER

## 2019-07-05 RX ORDER — ALLOPURINOL 300 MG/1
TABLET ORAL
Qty: 90 TABLET | Refills: 1 | Status: SHIPPED | OUTPATIENT
Start: 2019-07-05 | End: 2020-01-02

## 2019-09-03 RX ORDER — ATORVASTATIN CALCIUM 40 MG/1
TABLET, FILM COATED ORAL
Qty: 90 TABLET | Refills: 1 | Status: SHIPPED | OUTPATIENT
Start: 2019-09-03 | End: 2020-02-27

## 2019-09-10 RX ORDER — OMEPRAZOLE 40 MG/1
CAPSULE, DELAYED RELEASE ORAL
Qty: 90 CAPSULE | Refills: 1 | Status: SHIPPED | OUTPATIENT
Start: 2019-09-10 | End: 2020-03-05

## 2019-09-25 RX ORDER — DICLOFENAC SODIUM 75 MG/1
TABLET, DELAYED RELEASE ORAL
Qty: 180 TABLET | Refills: 1 | Status: SHIPPED | OUTPATIENT
Start: 2019-09-25 | End: 2020-03-24

## 2019-10-13 DIAGNOSIS — R29.898 WEAKNESS OF BOTH LOWER EXTREMITIES: ICD-10-CM

## 2019-10-14 RX ORDER — GABAPENTIN 300 MG/1
CAPSULE ORAL
Qty: 270 CAPSULE | Refills: 1 | Status: SHIPPED | OUTPATIENT
Start: 2019-10-14 | End: 2020-05-29 | Stop reason: SDUPTHER

## 2019-10-29 ENCOUNTER — OFFICE VISIT (OUTPATIENT)
Dept: INTERNAL MEDICINE CLINIC | Age: 69
End: 2019-10-29
Payer: MEDICARE

## 2019-10-29 VITALS
RESPIRATION RATE: 12 BRPM | SYSTOLIC BLOOD PRESSURE: 120 MMHG | WEIGHT: 315 LBS | DIASTOLIC BLOOD PRESSURE: 74 MMHG | BODY MASS INDEX: 42.66 KG/M2 | HEIGHT: 72 IN | HEART RATE: 66 BPM

## 2019-10-29 DIAGNOSIS — Z79.4 TYPE 2 DIABETES MELLITUS WITHOUT COMPLICATION, WITH LONG-TERM CURRENT USE OF INSULIN (HCC): ICD-10-CM

## 2019-10-29 DIAGNOSIS — Z00.00 WELL ADULT EXAM: Primary | ICD-10-CM

## 2019-10-29 DIAGNOSIS — E11.9 TYPE 2 DIABETES MELLITUS WITHOUT COMPLICATION, WITH LONG-TERM CURRENT USE OF INSULIN (HCC): ICD-10-CM

## 2019-10-29 DIAGNOSIS — E66.01 MORBID OBESITY (HCC): ICD-10-CM

## 2019-10-29 DIAGNOSIS — E78.2 MIXED HYPERLIPIDEMIA: ICD-10-CM

## 2019-10-29 DIAGNOSIS — I10 ESSENTIAL HYPERTENSION: ICD-10-CM

## 2019-10-29 DIAGNOSIS — Z00.00 ROUTINE GENERAL MEDICAL EXAMINATION AT A HEALTH CARE FACILITY: ICD-10-CM

## 2019-10-29 DIAGNOSIS — R29.898 WEAKNESS OF BOTH LOWER EXTREMITIES: ICD-10-CM

## 2019-10-29 DIAGNOSIS — K21.9 GASTROESOPHAGEAL REFLUX DISEASE WITHOUT ESOPHAGITIS: ICD-10-CM

## 2019-10-29 DIAGNOSIS — F17.200 TOBACCO USE DISORDER: ICD-10-CM

## 2019-10-29 DIAGNOSIS — G47.30 SLEEP APNEA, UNSPECIFIED TYPE: ICD-10-CM

## 2019-10-29 DIAGNOSIS — Z85.46 PERSONAL HISTORY OF MALIGNANT NEOPLASM OF PROSTATE: ICD-10-CM

## 2019-10-29 DIAGNOSIS — Z00.00 WELL ADULT EXAM: ICD-10-CM

## 2019-10-29 LAB
A/G RATIO: 2.2 (ref 1.1–2.2)
ALBUMIN SERPL-MCNC: 4.9 G/DL (ref 3.4–5)
ALP BLD-CCNC: 121 U/L (ref 40–129)
ALT SERPL-CCNC: 17 U/L (ref 10–40)
ANION GAP SERPL CALCULATED.3IONS-SCNC: 17 MMOL/L (ref 3–16)
AST SERPL-CCNC: 16 U/L (ref 15–37)
BASOPHILS ABSOLUTE: 0.1 K/UL (ref 0–0.2)
BASOPHILS RELATIVE PERCENT: 1.1 %
BILIRUB SERPL-MCNC: 0.4 MG/DL (ref 0–1)
BILIRUBIN URINE: NEGATIVE
BLOOD, URINE: ABNORMAL
BUN BLDV-MCNC: 25 MG/DL (ref 7–20)
CALCIUM SERPL-MCNC: 10.2 MG/DL (ref 8.3–10.6)
CHLORIDE BLD-SCNC: 101 MMOL/L (ref 99–110)
CHOLESTEROL, TOTAL: 133 MG/DL (ref 0–199)
CLARITY: CLEAR
CO2: 24 MMOL/L (ref 21–32)
COLOR: YELLOW
COMMENT UA: ABNORMAL
CREAT SERPL-MCNC: 1 MG/DL (ref 0.8–1.3)
CREATININE URINE: 88.5 MG/DL (ref 39–259)
EOSINOPHILS ABSOLUTE: 0.4 K/UL (ref 0–0.6)
EOSINOPHILS RELATIVE PERCENT: 5.1 %
EPITHELIAL CELLS, UA: 2 /HPF (ref 0–5)
GFR AFRICAN AMERICAN: >60
GFR NON-AFRICAN AMERICAN: >60
GLOBULIN: 2.2 G/DL
GLUCOSE BLD-MCNC: 108 MG/DL (ref 70–99)
GLUCOSE URINE: NEGATIVE MG/DL
HCT VFR BLD CALC: 43 % (ref 40.5–52.5)
HDLC SERPL-MCNC: 34 MG/DL (ref 40–60)
HEMOGLOBIN: 14 G/DL (ref 13.5–17.5)
HYALINE CASTS: 1 /LPF (ref 0–8)
KETONES, URINE: NEGATIVE MG/DL
LDL CHOLESTEROL CALCULATED: 57 MG/DL
LEUKOCYTE ESTERASE, URINE: ABNORMAL
LYMPHOCYTES ABSOLUTE: 1.5 K/UL (ref 1–5.1)
LYMPHOCYTES RELATIVE PERCENT: 20.2 %
MCH RBC QN AUTO: 28.8 PG (ref 26–34)
MCHC RBC AUTO-ENTMCNC: 32.6 G/DL (ref 31–36)
MCV RBC AUTO: 88.3 FL (ref 80–100)
MICROALBUMIN UR-MCNC: 4 MG/DL
MICROALBUMIN/CREAT UR-RTO: 45.2 MG/G (ref 0–30)
MICROSCOPIC EXAMINATION: YES
MONOCYTES ABSOLUTE: 0.6 K/UL (ref 0–1.3)
MONOCYTES RELATIVE PERCENT: 8.8 %
NEUTROPHILS ABSOLUTE: 4.7 K/UL (ref 1.7–7.7)
NEUTROPHILS RELATIVE PERCENT: 64.8 %
NITRITE, URINE: NEGATIVE
PDW BLD-RTO: 18 % (ref 12.4–15.4)
PH UA: 5.5 (ref 5–8)
PLATELET # BLD: 272 K/UL (ref 135–450)
PMV BLD AUTO: 8.4 FL (ref 5–10.5)
POTASSIUM SERPL-SCNC: 4.8 MMOL/L (ref 3.5–5.1)
PROSTATE SPECIFIC ANTIGEN: 6.84 NG/ML (ref 0–4)
PROTEIN UA: ABNORMAL MG/DL
RBC # BLD: 4.87 M/UL (ref 4.2–5.9)
RBC UA: 2 /HPF (ref 0–4)
SODIUM BLD-SCNC: 142 MMOL/L (ref 136–145)
SPECIFIC GRAVITY UA: 1.02 (ref 1–1.03)
TOTAL PROTEIN: 7.1 G/DL (ref 6.4–8.2)
TRIGL SERPL-MCNC: 208 MG/DL (ref 0–150)
TSH REFLEX: 2.13 UIU/ML (ref 0.27–4.2)
URINE REFLEX TO CULTURE: YES
URINE TYPE: ABNORMAL
UROBILINOGEN, URINE: 0.2 E.U./DL
VLDLC SERPL CALC-MCNC: 42 MG/DL
WBC # BLD: 7.2 K/UL (ref 4–11)
WBC UA: 29 /HPF (ref 0–5)

## 2019-10-29 PROCEDURE — 4040F PNEUMOC VAC/ADMIN/RCVD: CPT | Performed by: INTERNAL MEDICINE

## 2019-10-29 PROCEDURE — 1036F TOBACCO NON-USER: CPT | Performed by: INTERNAL MEDICINE

## 2019-10-29 PROCEDURE — G8482 FLU IMMUNIZE ORDER/ADMIN: HCPCS | Performed by: INTERNAL MEDICINE

## 2019-10-29 PROCEDURE — G8427 DOCREV CUR MEDS BY ELIG CLIN: HCPCS | Performed by: INTERNAL MEDICINE

## 2019-10-29 PROCEDURE — 3017F COLORECTAL CA SCREEN DOC REV: CPT | Performed by: INTERNAL MEDICINE

## 2019-10-29 PROCEDURE — G0439 PPPS, SUBSEQ VISIT: HCPCS | Performed by: INTERNAL MEDICINE

## 2019-10-29 PROCEDURE — 2022F DILAT RTA XM EVC RTNOPTHY: CPT | Performed by: INTERNAL MEDICINE

## 2019-10-29 PROCEDURE — 1123F ACP DISCUSS/DSCN MKR DOCD: CPT | Performed by: INTERNAL MEDICINE

## 2019-10-29 PROCEDURE — 99214 OFFICE O/P EST MOD 30 MIN: CPT | Performed by: INTERNAL MEDICINE

## 2019-10-29 PROCEDURE — G8417 CALC BMI ABV UP PARAM F/U: HCPCS | Performed by: INTERNAL MEDICINE

## 2019-10-29 ASSESSMENT — ENCOUNTER SYMPTOMS
EYES NEGATIVE: 1
GASTROINTESTINAL NEGATIVE: 1
ALLERGIC/IMMUNOLOGIC NEGATIVE: 1
APNEA: 1

## 2019-10-29 ASSESSMENT — PATIENT HEALTH QUESTIONNAIRE - PHQ9
SUM OF ALL RESPONSES TO PHQ QUESTIONS 1-9: 0
SUM OF ALL RESPONSES TO PHQ QUESTIONS 1-9: 0

## 2019-10-29 ASSESSMENT — LIFESTYLE VARIABLES: HOW OFTEN DO YOU HAVE A DRINK CONTAINING ALCOHOL: 0

## 2019-10-30 ENCOUNTER — TELEPHONE (OUTPATIENT)
Dept: INTERNAL MEDICINE CLINIC | Age: 69
End: 2019-10-30

## 2019-10-30 LAB
ESTIMATED AVERAGE GLUCOSE: 154.2 MG/DL
HBA1C MFR BLD: 7 %

## 2019-10-31 LAB — URINE CULTURE, ROUTINE: NORMAL

## 2020-01-02 RX ORDER — ALLOPURINOL 300 MG/1
TABLET ORAL
Qty: 90 TABLET | Refills: 1 | Status: SHIPPED | OUTPATIENT
Start: 2020-01-02 | End: 2020-06-29

## 2020-02-27 RX ORDER — ATORVASTATIN CALCIUM 40 MG/1
TABLET, FILM COATED ORAL
Qty: 90 TABLET | Refills: 0 | Status: SHIPPED | OUTPATIENT
Start: 2020-02-27 | End: 2020-05-26

## 2020-03-05 RX ORDER — OMEPRAZOLE 40 MG/1
CAPSULE, DELAYED RELEASE ORAL
Qty: 90 CAPSULE | Refills: 1 | Status: SHIPPED | OUTPATIENT
Start: 2020-03-05 | End: 2020-08-31

## 2020-03-24 RX ORDER — DICLOFENAC SODIUM 75 MG/1
TABLET, DELAYED RELEASE ORAL
Qty: 180 TABLET | Refills: 1 | Status: SHIPPED | OUTPATIENT
Start: 2020-03-24 | End: 2020-05-29

## 2020-05-07 ENCOUNTER — TELEPHONE (OUTPATIENT)
Dept: INTERNAL MEDICINE CLINIC | Age: 70
End: 2020-05-07

## 2020-05-26 RX ORDER — ATORVASTATIN CALCIUM 40 MG/1
TABLET, FILM COATED ORAL
Qty: 90 TABLET | Refills: 1 | Status: SHIPPED | OUTPATIENT
Start: 2020-05-26 | End: 2020-11-23

## 2020-05-29 ENCOUNTER — OFFICE VISIT (OUTPATIENT)
Dept: INTERNAL MEDICINE CLINIC | Age: 70
End: 2020-05-29
Payer: MEDICARE

## 2020-05-29 VITALS
TEMPERATURE: 97.8 F | DIASTOLIC BLOOD PRESSURE: 78 MMHG | HEART RATE: 68 BPM | RESPIRATION RATE: 12 BRPM | HEIGHT: 72 IN | WEIGHT: 315 LBS | SYSTOLIC BLOOD PRESSURE: 120 MMHG | BODY MASS INDEX: 42.66 KG/M2

## 2020-05-29 PROBLEM — N18.2 CHRONIC RENAL FAILURE, STAGE 2 (MILD): Status: ACTIVE | Noted: 2020-05-29

## 2020-05-29 PROCEDURE — 3017F COLORECTAL CA SCREEN DOC REV: CPT | Performed by: INTERNAL MEDICINE

## 2020-05-29 PROCEDURE — 4040F PNEUMOC VAC/ADMIN/RCVD: CPT | Performed by: INTERNAL MEDICINE

## 2020-05-29 PROCEDURE — G8417 CALC BMI ABV UP PARAM F/U: HCPCS | Performed by: INTERNAL MEDICINE

## 2020-05-29 PROCEDURE — G8427 DOCREV CUR MEDS BY ELIG CLIN: HCPCS | Performed by: INTERNAL MEDICINE

## 2020-05-29 PROCEDURE — 2022F DILAT RTA XM EVC RTNOPTHY: CPT | Performed by: INTERNAL MEDICINE

## 2020-05-29 PROCEDURE — 3051F HG A1C>EQUAL 7.0%<8.0%: CPT | Performed by: INTERNAL MEDICINE

## 2020-05-29 PROCEDURE — 99214 OFFICE O/P EST MOD 30 MIN: CPT | Performed by: INTERNAL MEDICINE

## 2020-05-29 PROCEDURE — 1123F ACP DISCUSS/DSCN MKR DOCD: CPT | Performed by: INTERNAL MEDICINE

## 2020-05-29 PROCEDURE — 1036F TOBACCO NON-USER: CPT | Performed by: INTERNAL MEDICINE

## 2020-05-29 RX ORDER — DICLOFENAC SODIUM 75 MG/1
75 TABLET, DELAYED RELEASE ORAL 2 TIMES DAILY PRN
Qty: 180 TABLET | Refills: 1
Start: 2020-05-29 | End: 2020-11-25 | Stop reason: ALTCHOICE

## 2020-05-29 ASSESSMENT — PATIENT HEALTH QUESTIONNAIRE - PHQ9
2. FEELING DOWN, DEPRESSED OR HOPELESS: 0
SUM OF ALL RESPONSES TO PHQ QUESTIONS 1-9: 0
SUM OF ALL RESPONSES TO PHQ9 QUESTIONS 1 & 2: 0
1. LITTLE INTEREST OR PLEASURE IN DOING THINGS: 0
SUM OF ALL RESPONSES TO PHQ QUESTIONS 1-9: 0

## 2020-05-29 ASSESSMENT — ENCOUNTER SYMPTOMS
APNEA: 1
GASTROINTESTINAL NEGATIVE: 1
ALLERGIC/IMMUNOLOGIC NEGATIVE: 1
EYES NEGATIVE: 1

## 2020-05-29 NOTE — PROGRESS NOTES
Subjective:      Patient ID: Brisa Bunch is a 68 y.o. male. HPI  Here today for follow up of chronic problems as per HPI and as problems listed under assessment and plan,no new c/o feels good diabetes slightly improved cont current meds slight decrease in renal function to decrease use of NSAIDS     Hyperlipidemia   This is a chronic problem. The current episode started more than 1 year ago. The problem is controlled. Recent lipid tests were reviewed and are low. Exacerbating diseases include diabetes. Factors aggravating his hyperlipidemia include smoking. Current antihyperlipidemic treatment includes diet change, exercise, statins and fibric acid derivatives. The current treatment provides significant improvement of lipids. Compliance problems include adherence to diet and adherence to exercise. Risk factors for coronary artery disease include diabetes mellitus, dyslipidemia, hypertension, male sex and obesity. Hypertension   This is a chronic problem. The current episode started more than 1 year ago. The problem is unchanged. The problem is controlled. There are no associated agents to hypertension. Risk factors for coronary artery disease include diabetes mellitus, dyslipidemia, male gender and obesity. Past treatments include ACE inhibitors and lifestyle changes. The current treatment provides significant improvement. Compliance problems include diet and exercise. Diabetes   He presents for his follow-up diabetic visit. He has type 2 diabetes mellitus. His disease course has been stable. There are no hypoglycemic associated symptoms. Associated symptoms include weakness. Pertinent negatives for diabetes include no fatigue. Symptoms are stable. Risk factors for coronary artery disease include diabetes mellitus, dyslipidemia, hypertension, male sex and obesity. Current diabetic treatment includes insulin injections and oral agent (dual therapy). He is compliant with treatment most of the time.  His weight is stable. He is following a diabetic, low fat/cholesterol and low salt diet. Meal planning includes avoidance of concentrated sweets. He participates in exercise intermittently. His home blood glucose trend is fluctuating minimally. An ACE inhibitor/angiotensin II receptor blocker is being taken. Eye exam is current. No Known Allergies    Current Outpatient Medications   Medication Sig Dispense Refill    diclofenac (VOLTAREN) 75 MG EC tablet Take 1 tablet by mouth 2 times daily as needed for Pain 180 tablet 1    atorvastatin (LIPITOR) 40 MG tablet TAKE ONE TABLET BY MOUTH DAILY 90 tablet 1    omeprazole (PRILOSEC) 40 MG delayed release capsule TAKE ONE CAPSULE BY MOUTH DAILY 90 capsule 1    metFORMIN (GLUCOPHAGE) 500 MG tablet TAKE TWO TABLETS BY MOUTH TWICE A  tablet 1    allopurinol (ZYLOPRIM) 300 MG tablet TAKE ONE TABLET BY MOUTH DAILY 90 tablet 1    gabapentin (NEURONTIN) 300 MG capsule TAKE ONE CAPSULE BY MOUTH THREE TIMES A  capsule 1    insulin glargine (TOUJEO SOLOSTAR) 300 UNIT/ML injection pen Inject 10 Units into the skin nightly       pioglitazone (ACTOS) 15 MG tablet Take 15 mg by mouth daily      Cholecalciferol (VITAMIN D3) 1000 units CAPS Take by mouth 2 times daily      lisinopril (PRINIVIL;ZESTRIL) 30 MG tablet Take 1 tablet by mouth daily      Dulaglutide 0.75 MG/0.5ML SOPN Inject 1.5 mg into the skin once a week 12 Pen 1    fish oil-omega-3 fatty acids (FISH OIL) 1000 MG capsule Take 1 g by mouth daily       aspirin 81 MG EC tablet Take 81 mg by mouth daily.  B-D ULTRA FINE LANCETS MISC by Does not apply route. 29 g x12mm       Blood Glucose Monitoring Suppl (ONE TOUCH ULTRA) by Does not apply route. Test strips       No current facility-administered medications for this visit.         Past Medical History:   Diagnosis Date    Cancer [facility]) 2007    Prostate Tx with rads Dr Shantel Conn Hyperlipidemia     Type II or unspecified type diabetes mellitus without

## 2020-06-08 ENCOUNTER — TELEPHONE (OUTPATIENT)
Dept: INTERNAL MEDICINE CLINIC | Age: 70
End: 2020-06-08

## 2020-06-17 ENCOUNTER — HOSPITAL ENCOUNTER (OUTPATIENT)
Dept: MRI IMAGING | Age: 70
Discharge: HOME | End: 2020-06-17
Payer: MEDICARE

## 2020-06-17 ENCOUNTER — TELEPHONE (OUTPATIENT)
Dept: INTERNAL MEDICINE CLINIC | Age: 70
End: 2020-06-17

## 2020-06-17 PROCEDURE — 72148 MRI LUMBAR SPINE W/O DYE: CPT

## 2020-06-18 ENCOUNTER — TELEPHONE (OUTPATIENT)
Dept: INTERNAL MEDICINE CLINIC | Age: 70
End: 2020-06-18

## 2020-06-29 RX ORDER — ALLOPURINOL 300 MG/1
TABLET ORAL
Qty: 90 TABLET | Refills: 0 | Status: SHIPPED | OUTPATIENT
Start: 2020-06-29 | End: 2020-09-28

## 2020-08-31 RX ORDER — OMEPRAZOLE 40 MG/1
CAPSULE, DELAYED RELEASE ORAL
Qty: 90 CAPSULE | Refills: 1 | Status: SHIPPED | OUTPATIENT
Start: 2020-08-31 | End: 2021-02-24

## 2020-09-28 RX ORDER — ALLOPURINOL 300 MG/1
TABLET ORAL
Qty: 90 TABLET | Refills: 1 | Status: SHIPPED | OUTPATIENT
Start: 2020-09-28 | End: 2021-03-24

## 2020-10-20 RX ORDER — GABAPENTIN 300 MG/1
CAPSULE ORAL
Qty: 270 CAPSULE | Refills: 0 | Status: SHIPPED | OUTPATIENT
Start: 2020-10-20 | End: 2021-01-05

## 2020-11-16 ENCOUNTER — TELEPHONE (OUTPATIENT)
Dept: INTERNAL MEDICINE CLINIC | Age: 70
End: 2020-11-16

## 2020-11-16 NOTE — TELEPHONE ENCOUNTER
Patient is requesting to speak to about switching his appointment. Patient only will speak to Irena about the appointment. Please call and advise.

## 2020-11-23 RX ORDER — ATORVASTATIN CALCIUM 40 MG/1
TABLET, FILM COATED ORAL
Qty: 90 TABLET | Refills: 0 | Status: SHIPPED | OUTPATIENT
Start: 2020-11-23 | End: 2021-02-18

## 2020-11-25 ENCOUNTER — OFFICE VISIT (OUTPATIENT)
Dept: INTERNAL MEDICINE CLINIC | Age: 70
End: 2020-11-25
Payer: MEDICARE

## 2020-11-25 VITALS
HEIGHT: 72 IN | DIASTOLIC BLOOD PRESSURE: 60 MMHG | BODY MASS INDEX: 42.66 KG/M2 | RESPIRATION RATE: 14 BRPM | TEMPERATURE: 97.8 F | WEIGHT: 315 LBS | SYSTOLIC BLOOD PRESSURE: 116 MMHG | HEART RATE: 66 BPM

## 2020-11-25 DIAGNOSIS — E78.2 MIXED HYPERLIPIDEMIA: ICD-10-CM

## 2020-11-25 DIAGNOSIS — I10 ESSENTIAL HYPERTENSION: ICD-10-CM

## 2020-11-25 DIAGNOSIS — Z79.4 TYPE 2 DIABETES MELLITUS WITHOUT COMPLICATION, WITH LONG-TERM CURRENT USE OF INSULIN (HCC): ICD-10-CM

## 2020-11-25 DIAGNOSIS — Z85.46 PERSONAL HISTORY OF MALIGNANT NEOPLASM OF PROSTATE: ICD-10-CM

## 2020-11-25 DIAGNOSIS — N18.2 CHRONIC RENAL FAILURE, STAGE 2 (MILD): ICD-10-CM

## 2020-11-25 DIAGNOSIS — K21.9 GASTROESOPHAGEAL REFLUX DISEASE WITHOUT ESOPHAGITIS: ICD-10-CM

## 2020-11-25 DIAGNOSIS — F17.200 TOBACCO USE DISORDER: ICD-10-CM

## 2020-11-25 DIAGNOSIS — E66.01 MORBID OBESITY (HCC): ICD-10-CM

## 2020-11-25 DIAGNOSIS — G47.30 SLEEP APNEA, UNSPECIFIED TYPE: ICD-10-CM

## 2020-11-25 DIAGNOSIS — R29.898 WEAKNESS OF BOTH LOWER EXTREMITIES: ICD-10-CM

## 2020-11-25 DIAGNOSIS — E11.9 TYPE 2 DIABETES MELLITUS WITHOUT COMPLICATION, WITH LONG-TERM CURRENT USE OF INSULIN (HCC): ICD-10-CM

## 2020-11-25 PROBLEM — Z01.818 PREOP EXAMINATION: Status: ACTIVE | Noted: 2020-11-25

## 2020-11-25 LAB
A/G RATIO: 1.4 (ref 1.1–2.2)
ALBUMIN SERPL-MCNC: 3.9 G/DL (ref 3.4–5)
ALP BLD-CCNC: 134 U/L (ref 40–129)
ALT SERPL-CCNC: 6 U/L (ref 10–40)
ANION GAP SERPL CALCULATED.3IONS-SCNC: 14 MMOL/L (ref 3–16)
AST SERPL-CCNC: 10 U/L (ref 15–37)
BASOPHILS ABSOLUTE: 0.1 K/UL (ref 0–0.2)
BASOPHILS RELATIVE PERCENT: 0.9 %
BILIRUB SERPL-MCNC: 0.4 MG/DL (ref 0–1)
BILIRUBIN URINE: NEGATIVE
BLOOD, URINE: ABNORMAL
BUN BLDV-MCNC: 20 MG/DL (ref 7–20)
CALCIUM SERPL-MCNC: 9.9 MG/DL (ref 8.3–10.6)
CHLORIDE BLD-SCNC: 99 MMOL/L (ref 99–110)
CHOLESTEROL, TOTAL: 128 MG/DL (ref 0–199)
CLARITY: ABNORMAL
CO2: 25 MMOL/L (ref 21–32)
COLOR: ABNORMAL
CREAT SERPL-MCNC: 0.9 MG/DL (ref 0.8–1.3)
CREATININE URINE: 114.3 MG/DL (ref 39–259)
EOSINOPHILS ABSOLUTE: 0.2 K/UL (ref 0–0.6)
EOSINOPHILS RELATIVE PERCENT: 3.1 %
EPITHELIAL CELLS, UA: 1 /HPF (ref 0–5)
GFR AFRICAN AMERICAN: >60
GFR NON-AFRICAN AMERICAN: >60
GLOBULIN: 2.8 G/DL
GLUCOSE BLD-MCNC: 121 MG/DL (ref 70–99)
GLUCOSE URINE: NEGATIVE MG/DL
HCT VFR BLD CALC: 39.2 % (ref 40.5–52.5)
HDLC SERPL-MCNC: 43 MG/DL (ref 40–60)
HEMOGLOBIN: 12.5 G/DL (ref 13.5–17.5)
HYALINE CASTS: 9 /LPF (ref 0–8)
KETONES, URINE: NEGATIVE MG/DL
LDL CHOLESTEROL CALCULATED: 60 MG/DL
LEUKOCYTE ESTERASE, URINE: NEGATIVE
LYMPHOCYTES ABSOLUTE: 1.2 K/UL (ref 1–5.1)
LYMPHOCYTES RELATIVE PERCENT: 15.4 %
MCH RBC QN AUTO: 26.7 PG (ref 26–34)
MCHC RBC AUTO-ENTMCNC: 32 G/DL (ref 31–36)
MCV RBC AUTO: 83.5 FL (ref 80–100)
MICROALBUMIN UR-MCNC: 39.2 MG/DL
MICROALBUMIN/CREAT UR-RTO: 343 MG/G (ref 0–30)
MICROSCOPIC EXAMINATION: YES
MONOCYTES ABSOLUTE: 0.6 K/UL (ref 0–1.3)
MONOCYTES RELATIVE PERCENT: 8.1 %
NEUTROPHILS ABSOLUTE: 5.7 K/UL (ref 1.7–7.7)
NEUTROPHILS RELATIVE PERCENT: 72.5 %
NITRITE, URINE: NEGATIVE
PDW BLD-RTO: 18.4 % (ref 12.4–15.4)
PH UA: 6.5 (ref 5–8)
PLATELET # BLD: 393 K/UL (ref 135–450)
PMV BLD AUTO: 7.8 FL (ref 5–10.5)
POTASSIUM SERPL-SCNC: 4.5 MMOL/L (ref 3.5–5.1)
PROSTATE SPECIFIC ANTIGEN: 5.1 NG/ML (ref 0–4)
PROTEIN UA: 100 MG/DL
RBC # BLD: 4.7 M/UL (ref 4.2–5.9)
RBC UA: >900 /HPF (ref 0–4)
SODIUM BLD-SCNC: 138 MMOL/L (ref 136–145)
SPECIFIC GRAVITY UA: 1.02 (ref 1–1.03)
TOTAL PROTEIN: 6.7 G/DL (ref 6.4–8.2)
TRIGL SERPL-MCNC: 127 MG/DL (ref 0–150)
TSH REFLEX: 1.24 UIU/ML (ref 0.27–4.2)
URINE REFLEX TO CULTURE: YES
URINE TYPE: ABNORMAL
UROBILINOGEN, URINE: 0.2 E.U./DL
VLDLC SERPL CALC-MCNC: 25 MG/DL
WBC # BLD: 7.9 K/UL (ref 4–11)
WBC UA: 105 /HPF (ref 0–5)

## 2020-11-25 PROCEDURE — 99215 OFFICE O/P EST HI 40 MIN: CPT | Performed by: INTERNAL MEDICINE

## 2020-11-25 PROCEDURE — 1123F ACP DISCUSS/DSCN MKR DOCD: CPT | Performed by: INTERNAL MEDICINE

## 2020-11-25 PROCEDURE — 1036F TOBACCO NON-USER: CPT | Performed by: INTERNAL MEDICINE

## 2020-11-25 PROCEDURE — 93000 ELECTROCARDIOGRAM COMPLETE: CPT | Performed by: INTERNAL MEDICINE

## 2020-11-25 PROCEDURE — 3051F HG A1C>EQUAL 7.0%<8.0%: CPT | Performed by: INTERNAL MEDICINE

## 2020-11-25 PROCEDURE — 3017F COLORECTAL CA SCREEN DOC REV: CPT | Performed by: INTERNAL MEDICINE

## 2020-11-25 PROCEDURE — G8427 DOCREV CUR MEDS BY ELIG CLIN: HCPCS | Performed by: INTERNAL MEDICINE

## 2020-11-25 PROCEDURE — G8417 CALC BMI ABV UP PARAM F/U: HCPCS | Performed by: INTERNAL MEDICINE

## 2020-11-25 PROCEDURE — 4040F PNEUMOC VAC/ADMIN/RCVD: CPT | Performed by: INTERNAL MEDICINE

## 2020-11-25 PROCEDURE — 2022F DILAT RTA XM EVC RTNOPTHY: CPT | Performed by: INTERNAL MEDICINE

## 2020-11-25 PROCEDURE — G8484 FLU IMMUNIZE NO ADMIN: HCPCS | Performed by: INTERNAL MEDICINE

## 2020-11-25 ASSESSMENT — ENCOUNTER SYMPTOMS
ALLERGIC/IMMUNOLOGIC NEGATIVE: 1
APNEA: 1
GASTROINTESTINAL NEGATIVE: 1
EYES NEGATIVE: 1

## 2020-11-25 NOTE — ASSESSMENT & PLAN NOTE
Pre op exam and evaluation - risks versus benefits are acceptable for this patients above mentioned procedure for cysto and Bx if needed as per Dr Kathy Olvera

## 2020-11-25 NOTE — LETTER
[facility] [address]  [address], [address] [place] [id number]  Phone: [phone]  Fax: [phone]    Racheal Michael MD        November 25, 2020     No referring provider defined for this encounter. Patient: Denice Suarez   MR Number: [id number]   YOB: 1950   Date of Visit: 11/25/2020       Dear Dr. Brandin Oliva ref. provider found: Thank you for referring Tk Underwood to me for evaluation. Below are the relevant portions of my assessment and plan of care. If you have questions, please do not hesitate to call me. I look forward to following Monique Zamora along with you.     Sincerely,        Racheal Michael MD

## 2020-11-25 NOTE — PROGRESS NOTES
diet. Meal planning includes avoidance of concentrated sweets. He participates in exercise intermittently. His home blood glucose trend is fluctuating minimally. An ACE inhibitor/angiotensin II receptor blocker is being taken. Eye exam is current. No Known Allergies    Current Outpatient Medications   Medication Sig Dispense Refill    atorvastatin (LIPITOR) 40 MG tablet TAKE ONE TABLET BY MOUTH DAILY 90 tablet 0    gabapentin (NEURONTIN) 300 MG capsule TAKE ONE CAPSULE BY MOUTH THREE TIMES A  capsule 0    allopurinol (ZYLOPRIM) 300 MG tablet TAKE ONE TABLET BY MOUTH DAILY 90 tablet 1    metFORMIN (GLUCOPHAGE) 500 MG tablet TAKE TWO TABLETS BY MOUTH TWICE A  tablet 1    omeprazole (PRILOSEC) 40 MG delayed release capsule TAKE ONE CAPSULE BY MOUTH DAILY 90 capsule 1    insulin glargine (TOUJEO SOLOSTAR) 300 UNIT/ML injection pen Inject 10 Units into the skin nightly       pioglitazone (ACTOS) 15 MG tablet Take 15 mg by mouth daily      Cholecalciferol (VITAMIN D3) 1000 units CAPS Take by mouth 2 times daily      lisinopril (PRINIVIL;ZESTRIL) 30 MG tablet Take 1 tablet by mouth daily      Dulaglutide 0.75 MG/0.5ML SOPN Inject 1.5 mg into the skin once a week 12 Pen 1    fish oil-omega-3 fatty acids (FISH OIL) 1000 MG capsule Take 1 g by mouth daily       aspirin 81 MG EC tablet Take 81 mg by mouth daily.  B-D ULTRA FINE LANCETS MISC by Does not apply route. 29 g x12mm       Blood Glucose Monitoring Suppl (ONE TOUCH ULTRA) by Does not apply route. Test strips       No current facility-administered medications for this visit.         Past Medical History:   Diagnosis Date    Cancer [facility]) 2007    Prostate Tx with rads Dr Julissa Mcnair Hyperlipidemia     Type II or unspecified type diabetes mellitus without mention of complication, not stated as uncontrolled     now seeing Dr Petra Dilalrd       Family History   Problem Relation Age of Onset    Coronary Art Dis Father     Heart Disease Father  Diabetes Brother        Past Surgical History:   Procedure Laterality Date    APPENDECTOMY      COLONOSCOPY  1998    normal     PROSTATE SURGERY  2007    positive biopsy for cancer       Social History     Socioeconomic History    Marital status: Single     Spouse name: Not on file    Number of children: Not on file    Years of education: Not on file    Highest education level: Not on file   Occupational History    Not on file   Social Needs    Financial resource strain: Not on file    Food insecurity     Worry: Not on file     Inability: Not on file    Transportation needs     Medical: Not on file     Non-medical: Not on file   Tobacco Use    Smoking status: Former Smoker     Packs/day: 2.00     Years: 100.00     Pack years: 200.00     Start date: 1965     Last attempt to quit: 2018     Years since quittin.7    Smokeless tobacco: Never Used    Tobacco comment: has been trying to  try gum or patch -to try Chantix stopped then started again    Substance and Sexual Activity    Alcohol use: No    Drug use: No    Sexual activity: Not on file   Lifestyle    Physical activity     Days per week: Not on file     Minutes per session: Not on file    Stress: Not on file   Relationships    Social connections     Talks on phone: Not on file     Gets together: Not on file     Attends Sikhism service: Not on file     Active member of club or organization: Not on file     Attends meetings of clubs or organizations: Not on file     Relationship status: Not on file    Intimate partner violence     Fear of current or ex partner: Not on file     Emotionally abused: Not on file     Physically abused: Not on file     Forced sexual activity: Not on file   Other Topics Concern    Not on file   Social History Narrative    Not on file       Review of Systems  Review of Systems   Constitutional: Positive for unexpected weight change (up a few # ). Negative for fatigue. HENT: Negative.          Has keratotic lesions of legs    Neurological:      General: No focal deficit present. Mental Status: He is alert and oriented to person, place, and time. Mental status is at baseline. Deep Tendon Reflexes: Reflexes are normal and symmetric. Comments:      Psychiatric:         Mood and Affect: Mood normal.         Behavior: Behavior normal.         Thought Content: Thought content normal.         /60 (Site: Right Upper Arm, Position: Sitting, Cuff Size: Medium Adult)   Pulse 66   Temp 97.8 °F (36.6 °C) (Oral)   Resp 14   Ht 6' (1.829 m)   Wt (!) 343 lb 6.4 oz (155.8 kg)   BMI 46.57 kg/m²       Assessment & Plan:         Weakness of both lower extremities  As noted still needs to lose weight etc     GERD (gastroesophageal reflux disease)  Controled with diet and prn meds     Type 2 diabetes mellitus without complication (HCC)  Cont meds and diet labs pending -last HgA1C was 6.4     Morbid obesity (HCC)  Needs to diet and exercise etc     Essential hypertension  Stable no change in meds return in 3 mo. Labs pending     Mixed hyperlipidemia  Stable no change in meds return in 3 mo. Labs pending     Sleep apnea  Cont to use C-pap     Tobacco use disorder  Still needs to DC counseled again     Chronic renal failure, stage 2 (mild)  Check labs still needs to limit NSAIDS     Personal history of malignant neoplasm of prostate  Pre op exam and evaluation - risks versus benefits are acceptable for this patients above mentioned procedure for cysto and Bx if needed as per Dr Thai Medeiros examination  Pre op exam and evaluation - risks versus benefits are acceptable for this patients above mentioned procedure for cysto and Bx if needed as per Dr Vertell Gottron   EKG -NSR no changes ok for cysto   45 minutes spent with patient and family/caregivers discussing diagnoses and plan; at least 50% of which was for care coordination.

## 2020-11-25 NOTE — LETTER
[facility] [address]  [address] [address]  Phone: [phone]  Fax: [phone]    Stoney Murillo MD        November 25, 2020     Dr Andrew Byrd    Patient: Roberta Gooden   MR Number: [id number]   YOB: 1950   Date of Visit: 11/25/2020   Dr Andrew Byrd  Thank you for referring Shani Gonzalez to me for evaluation. Below are the relevant portions of my assessment and plan of care. Subjective:      Patient ID: Roberta Gooden is a 65 y.o. male. HPI  Here today for pre op H&P for the following procedurePre op exam and evaluation -  for cysto and Bx if needed for hematuria and prostate Ca as per Dr Andrew Byrd      Hyperlipidemia   This is a chronic problem. The current episode started more than 1 year ago. The problem is controlled. Recent lipid tests were reviewed and are low. Exacerbating diseases include diabetes. Factors aggravating his hyperlipidemia include smoking. Current antihyperlipidemic treatment includes diet change, exercise, statins and fibric acid derivatives. The current treatment provides significant improvement of lipids. Compliance problems include adherence to diet and adherence to exercise. Risk factors for coronary artery disease include diabetes mellitus, dyslipidemia, hypertension, male sex and obesity. Hypertension   This is a chronic problem. The current episode started more than 1 year ago. The problem is unchanged. The problem is controlled. There are no associated agents to hypertension. Risk factors for coronary artery disease include diabetes mellitus, dyslipidemia, male gender and obesity. Past treatments include ACE inhibitors and lifestyle changes. The current treatment provides significant improvement. Compliance problems include diet and exercise. Diabetes   He presents for his follow-up diabetic visit. He has type 2 diabetes mellitus. His disease course has been stable.  There are no hypoglycemic associated symptoms. Associated symptoms include weakness. Pertinent negatives for diabetes include no fatigue. Symptoms are stable. Risk factors for coronary artery disease include diabetes mellitus, dyslipidemia, hypertension, male sex and obesity. Current diabetic treatment includes insulin injections and oral agent (dual therapy). He is compliant with treatment most of the time. His weight is stable. He is following a diabetic, low fat/cholesterol and low salt diet. Meal planning includes avoidance of concentrated sweets. He participates in exercise intermittently. His home blood glucose trend is fluctuating minimally. An ACE inhibitor/angiotensin II receptor blocker is being taken. Eye exam is current. No Known Allergies    Current Outpatient Medications   Medication Sig Dispense Refill    atorvastatin (LIPITOR) 40 MG tablet TAKE ONE TABLET BY MOUTH DAILY 90 tablet 0    gabapentin (NEURONTIN) 300 MG capsule TAKE ONE CAPSULE BY MOUTH THREE TIMES A  capsule 0    allopurinol (ZYLOPRIM) 300 MG tablet TAKE ONE TABLET BY MOUTH DAILY 90 tablet 1    metFORMIN (GLUCOPHAGE) 500 MG tablet TAKE TWO TABLETS BY MOUTH TWICE A  tablet 1    omeprazole (PRILOSEC) 40 MG delayed release capsule TAKE ONE CAPSULE BY MOUTH DAILY 90 capsule 1    insulin glargine (TOUJEO SOLOSTAR) 300 UNIT/ML injection pen Inject 10 Units into the skin nightly       pioglitazone (ACTOS) 15 MG tablet Take 15 mg by mouth daily      Cholecalciferol (VITAMIN D3) 1000 units CAPS Take by mouth 2 times daily      lisinopril (PRINIVIL;ZESTRIL) 30 MG tablet Take 1 tablet by mouth daily      Dulaglutide 0.75 MG/0.5ML SOPN Inject 1.5 mg into the skin once a week 12 Pen 1    fish oil-omega-3 fatty acids (FISH OIL) 1000 MG capsule Take 1 g by mouth daily       aspirin 81 MG EC tablet Take 81 mg by mouth daily.  B-D ULTRA FINE LANCETS MISC by Does not apply route.  29 g x12mm  Blood Glucose Monitoring Suppl (ONE TOUCH ULTRA) by Does not apply route. Test strips       No current facility-administered medications for this visit.         Past Medical History:   Diagnosis Date    Cancer [facility]) 2007    Prostate Tx with rads Dr Neal Speaks Hyperlipidemia     Type II or unspecified type diabetes mellitus without mention of complication, not stated as uncontrolled     now seeing Dr Francisco Javier Parekh       Family History   Problem Relation Age of Onset    Coronary Art Dis Father     Heart Disease Father     Diabetes Brother        Past Surgical History:   Procedure Laterality Date    APPENDECTOMY      COLONOSCOPY  1998    normal     PROSTATE SURGERY  2007    positive biopsy for cancer       Social History     Socioeconomic History    Marital status: Single     Spouse name: Not on file    Number of children: Not on file    Years of education: Not on file    Highest education level: Not on file   Occupational History    Not on file   Social Needs    Financial resource strain: Not on file    Food insecurity     Worry: Not on file     Inability: Not on file    Transportation needs     Medical: Not on file     Non-medical: Not on file   Tobacco Use    Smoking status: Former Smoker     Packs/day: 2.00     Years: 100.00     Pack years: 200.00     Start date: 1965     Last attempt to quit: 2018     Years since quittin.7    Smokeless tobacco: Never Used    Tobacco comment: has been trying to  try gum or patch -to try Chantix stopped then started again    Substance and Sexual Activity    Alcohol use: No    Drug use: No    Sexual activity: Not on file   Lifestyle    Physical activity     Days per week: Not on file     Minutes per session: Not on file    Stress: Not on file   Relationships    Social connections     Talks on phone: Not on file     Gets together: Not on file     Attends Evangelical service: Not on file     Active member of club or organization: Not on file Attends meetings of clubs or organizations: Not on file     Relationship status: Not on file    Intimate partner violence     Fear of current or ex partner: Not on file     Emotionally abused: Not on file     Physically abused: Not on file     Forced sexual activity: Not on file   Other Topics Concern    Not on file   Social History Narrative    Not on file       Review of Systems  Review of Systems   Constitutional: Positive for unexpected weight change (up a few # ). Negative for fatigue. HENT: Negative. Has abused Afrin for years off now    Eyes: Negative. Last exam was ok    Respiratory: Positive for apnea. Cont to use C-pap   Cont to smoke had neg. CT as noted    Cardiovascular: Negative. Gastrointestinal: Negative. Needs 10 year F/U with colonoscopy - cologuard neg 2019     Endocrine: Negative. Stable diabetic control    Genitourinary: Positive for frequency, hematuria and urgency. Cont with Dr James Stark   Hx of renal colic none since being on allopurinol  1995  S/P Prostate CA 2007 was Tx with rads -PSA increase again to see Dr James Stark  - now seeing Dr Kaila Richards  For cysto and cont F/U for Prostate CA  And hematuria    Musculoskeletal: Positive for arthralgias (knee c/o   has DC NSAIDS ). Skin: Negative. Allergic/Immunologic: Negative. Neurological: Positive for weakness. Negative for numbness. Hematological: Negative. Objective:   Physical Exam:  Physical Exam  Constitutional:       Appearance: He is well-developed. Comments: Obese   HENT:      Head: Normocephalic and atraumatic. Right Ear: External ear normal.      Left Ear: External ear normal.   Eyes:      Conjunctiva/sclera: Conjunctivae normal.      Pupils: Pupils are equal, round, and reactive to light. Neck:      Musculoskeletal: Normal range of motion and neck supple. Thyroid: No thyromegaly. Trachea: No tracheal deviation.    Cardiovascular: Rate and Rhythm: Normal rate and regular rhythm. Pulses: Normal pulses. Heart sounds: Normal heart sounds. No murmur. Pulmonary:      Effort: Pulmonary effort is normal.      Breath sounds: Normal breath sounds. Abdominal:      General: Abdomen is flat. Bowel sounds are normal.      Palpations: Abdomen is soft. Comments: obese   Genitourinary:     Comments:   Per Dr Queen Castañeda   Musculoskeletal: Normal range of motion. General: Tenderness: knees improved    Skin:     General: Skin is warm and dry. Comments: Mult keratotic lesions of legs    Neurological:      General: No focal deficit present. Mental Status: He is alert and oriented to person, place, and time. Mental status is at baseline. Deep Tendon Reflexes: Reflexes are normal and symmetric. Comments:      Psychiatric:         Mood and Affect: Mood normal.         Behavior: Behavior normal.         Thought Content: Thought content normal.         /60 (Site: Right Upper Arm, Position: Sitting, Cuff Size: Medium Adult)   Pulse 66   Temp 97.8 °F (36.6 °C) (Oral)   Resp 14   Ht 6' (1.829 m)   Wt (!) 343 lb 6.4 oz (155.8 kg)   BMI 46.57 kg/m²       Assessment & Plan:         Weakness of both lower extremities  As noted still needs to lose weight etc     GERD (gastroesophageal reflux disease)  Controled with diet and prn meds     Type 2 diabetes mellitus without complication (HCC)  Cont meds and diet labs pending -last HgA1C was 6.4     Morbid obesity (HCC)  Needs to diet and exercise etc     Essential hypertension  Stable no change in meds return in 3 mo. Labs pending     Mixed hyperlipidemia  Stable no change in meds return in 3 mo.  Labs pending     Sleep apnea  Cont to use C-pap     Tobacco use disorder  Still needs to DC counseled again     Chronic renal failure, stage 2 (mild)  Check labs still needs to limit NSAIDS     Personal history of malignant neoplasm of prostate Pre op exam and evaluation - risks versus benefits are acceptable for this patients above mentioned procedure for cysto and Bx if needed as per Dr Laquita Tatum examination  Pre op exam and evaluation - risks versus benefits are acceptable for this patients above mentioned procedure for cysto and Bx if needed as per Dr Correa Mount   EKG -NSR no changes ok for cysto   45 minutes spent with patient and family/caregivers discussing diagnoses and plan; at least 50% of which was for care coordination. If you have questions, please do not hesitate to call me. I look forward to following Willie Dennison along with you.     Sincerely,        Shawn Culver MD

## 2020-11-25 NOTE — ASSESSMENT & PLAN NOTE
Pre op exam and evaluation - risks versus benefits are acceptable for this patients above mentioned procedure for cysto and Bx if needed as per Dr Jaya Hartman

## 2020-11-26 LAB
ESTIMATED AVERAGE GLUCOSE: 142.7 MG/DL
HBA1C MFR BLD: 6.6 %
URINE CULTURE, ROUTINE: NORMAL

## 2020-12-21 ENCOUNTER — TELEPHONE (OUTPATIENT)
Dept: INTERNAL MEDICINE CLINIC | Age: 70
End: 2020-12-21

## 2020-12-21 NOTE — TELEPHONE ENCOUNTER
Patient states he is having bladder surgery on 1/12/21 and would like to know if he has to have another pre-op. Patient was advised pre-op are good for 30 days. Please advise.

## 2020-12-25 PROBLEM — Z01.818 PREOP EXAMINATION: Status: RESOLVED | Noted: 2020-11-25 | Resolved: 2020-12-25

## 2020-12-29 ENCOUNTER — OFFICE VISIT (OUTPATIENT)
Dept: INTERNAL MEDICINE CLINIC | Age: 70
End: 2020-12-29
Payer: MEDICARE

## 2020-12-29 VITALS
TEMPERATURE: 96.8 F | WEIGHT: 315 LBS | HEART RATE: 66 BPM | HEIGHT: 72 IN | SYSTOLIC BLOOD PRESSURE: 122 MMHG | BODY MASS INDEX: 42.66 KG/M2 | DIASTOLIC BLOOD PRESSURE: 60 MMHG | RESPIRATION RATE: 14 BRPM

## 2020-12-29 PROCEDURE — G8484 FLU IMMUNIZE NO ADMIN: HCPCS | Performed by: INTERNAL MEDICINE

## 2020-12-29 PROCEDURE — 1123F ACP DISCUSS/DSCN MKR DOCD: CPT | Performed by: INTERNAL MEDICINE

## 2020-12-29 PROCEDURE — 2022F DILAT RTA XM EVC RTNOPTHY: CPT | Performed by: INTERNAL MEDICINE

## 2020-12-29 PROCEDURE — G8427 DOCREV CUR MEDS BY ELIG CLIN: HCPCS | Performed by: INTERNAL MEDICINE

## 2020-12-29 PROCEDURE — G8417 CALC BMI ABV UP PARAM F/U: HCPCS | Performed by: INTERNAL MEDICINE

## 2020-12-29 PROCEDURE — 1036F TOBACCO NON-USER: CPT | Performed by: INTERNAL MEDICINE

## 2020-12-29 PROCEDURE — 99215 OFFICE O/P EST HI 40 MIN: CPT | Performed by: INTERNAL MEDICINE

## 2020-12-29 PROCEDURE — 3017F COLORECTAL CA SCREEN DOC REV: CPT | Performed by: INTERNAL MEDICINE

## 2020-12-29 PROCEDURE — 3044F HG A1C LEVEL LT 7.0%: CPT | Performed by: INTERNAL MEDICINE

## 2020-12-29 PROCEDURE — 4040F PNEUMOC VAC/ADMIN/RCVD: CPT | Performed by: INTERNAL MEDICINE

## 2020-12-29 RX ORDER — TROSPIUM CHLORIDE 20 MG/1
20 TABLET, FILM COATED ORAL 2 TIMES DAILY
COMMUNITY
Start: 2020-12-29 | End: 2021-03-02

## 2020-12-29 RX ORDER — METHENAMINE, SODIUM PHOSPHATE, MONOBASIC, METHYLENE BLUE, AND HYOSCYAMINE SULFATE 81.6; 40.8; 10.8; .12 MG/1; MG/1; MG/1; MG/1
TABLET, COATED ORAL
COMMUNITY
Start: 2020-12-10 | End: 2021-03-02

## 2020-12-29 ASSESSMENT — ENCOUNTER SYMPTOMS
GASTROINTESTINAL NEGATIVE: 1
APNEA: 1
ALLERGIC/IMMUNOLOGIC NEGATIVE: 1
EYES NEGATIVE: 1

## 2020-12-29 NOTE — LETTER
Kat calling regarding below, please call to advise.    [facility] [address]  [address] [facility] [address] [address]  Phone: [phone]  Fax: [phone]    Humberto Dubois MD        December 29, 2020     Patient: Alex Waters   YOB: 1950   Date of Visit: 12/29/2020       To Whom It May Concern: It is my medical opinion that Yvonne Price requires a disability parking placard for the following reasons:    He cannot walk without assistance from another person or the use of an assistance device (cane, crutch, prosthetic device, wheelchair, etc.). Duration of need: 5 years    If you have any questions or concerns, please don't hesitate to call.     Sincerely,        Humberto Dubois MD   [phone]

## 2020-12-29 NOTE — ASSESSMENT & PLAN NOTE
Pre op exam and evaluation - risks versus benefits are acceptable for this patients above mentioned procedure for cysto and Bx  for hematuria and prostate Ca  1st BX 11/25/20 was inconclusive now for repeat  as per Dr Bren Bell

## 2020-12-29 NOTE — PROGRESS NOTES
Subjective:      Patient ID: Michell Medina is a 65 y.o. male. HPI  Here today for pre op H&P for the following procedurePre op exam and evaluation -  for cysto and Bx  for hematuria and prostate Ca  1st BX 11/25/20 was inconclusive now for repeat  as per Dr Ar Puentes      Hyperlipidemia  This is a chronic problem. The current episode started more than 1 year ago. The problem is controlled. Recent lipid tests were reviewed and are low. Exacerbating diseases include diabetes. Factors aggravating his hyperlipidemia include smoking. Current antihyperlipidemic treatment includes diet change, exercise, statins and fibric acid derivatives. The current treatment provides significant improvement of lipids. Compliance problems include adherence to diet and adherence to exercise. Risk factors for coronary artery disease include diabetes mellitus, dyslipidemia, hypertension, male sex and obesity. Hypertension  This is a chronic problem. The current episode started more than 1 year ago. The problem is unchanged. The problem is controlled. There are no associated agents to hypertension. Risk factors for coronary artery disease include diabetes mellitus, dyslipidemia, male gender and obesity. Past treatments include ACE inhibitors and lifestyle changes. The current treatment provides significant improvement. Compliance problems include diet and exercise.     Diabetes  lisinopril (PRINIVIL;ZESTRIL) 30 MG tablet Take 1 tablet by mouth daily      Dulaglutide 0.75 MG/0.5ML SOPN Inject 1.5 mg into the skin once a week 12 Pen 1    fish oil-omega-3 fatty acids (FISH OIL) 1000 MG capsule Take 1 g by mouth daily       aspirin 81 MG EC tablet Take 81 mg by mouth daily.  B-D ULTRA FINE LANCETS MISC by Does not apply route. 29 g x12mm       Blood Glucose Monitoring Suppl (ONE TOUCH ULTRA) by Does not apply route. Test strips       No current facility-administered medications for this visit. Past Medical History:   Diagnosis Date    Cancer [facility])     Prostate Tx with rads Dr Jenniffer Valverde Hyperlipidemia     Type II or unspecified type diabetes mellitus without mention of complication, not stated as uncontrolled     now seeing Dr Atkinson Mandaen       Family History   Problem Relation Age of Onset    Coronary Art Dis Father     Heart Disease Father     Diabetes Brother        Past Surgical History:   Procedure Laterality Date    APPENDECTOMY      COLONOSCOPY      normal     PROSTATE SURGERY      positive biopsy for cancer       Social History     Socioeconomic History    Marital status: Single     Spouse name: Not on file    Number of children: Not on file    Years of education: Not on file    Highest education level: Not on file   Occupational History    Not on file   Social Needs    Financial resource strain: Not on file    Food insecurity     Worry: Not on file     Inability: Not on file    Transportation needs     Medical: Not on file     Non-medical: Not on file   Tobacco Use    Smoking status: Former Smoker     Packs/day: 2.00     Years: 100.00     Pack years: 200.00     Start date: 1965     Quit date: 2018     Years since quittin.8    Smokeless tobacco: Never Used    Tobacco comment: has been trying to  try gum or patch -to try Chantix stopped then started again    Substance and Sexual Activity    Alcohol use: No    Drug use:  No  Sexual activity: Not on file   Lifestyle    Physical activity     Days per week: Not on file     Minutes per session: Not on file    Stress: Not on file   Relationships    Social connections     Talks on phone: Not on file     Gets together: Not on file     Attends Druze service: Not on file     Active member of club or organization: Not on file     Attends meetings of clubs or organizations: Not on file     Relationship status: Not on file    Intimate partner violence     Fear of current or ex partner: Not on file     Emotionally abused: Not on file     Physically abused: Not on file     Forced sexual activity: Not on file   Other Topics Concern    Not on file   Social History Narrative    Not on file       Review of Systems  Review of Systems   Constitutional: Positive for appetite change (decreased ) and unexpected weight change (down 10#). Negative for fatigue. HENT: Negative. Has abused Afrin for years off now    Eyes: Negative. Last exam was ok    Respiratory: Positive for apnea. Cont to use C-pap   Cont to smoke had neg. CT as noted    Cardiovascular: Negative. Gastrointestinal: Negative. Needs 10 year F/U with colonoscopy - cologuard neg 2019     Endocrine: Negative. Stable diabetic control    Genitourinary: Positive for frequency, hematuria and urgency. Cont with Dr Isabella Cerda  Hx of renal colic none since being on allopurinol  1995  S/P Prostate CA 2007 was Tx with rads -PSA increase again to see Dr Samson Leader  - now seeing Dr Isabella Cerda  For cysto and cont F/U for Prostate CA  And hematuria as noted    Musculoskeletal: Positive for arthralgias (knee c/o   has DC NSAIDS ). Skin: Negative. Allergic/Immunologic: Negative. Neurological: Positive for weakness. Negative for numbness. Hematological: Negative. Objective:   Physical Exam:  Physical Exam  Constitutional:       Appearance: He is well-developed.       Comments: Obese   HENT: Stable continue current meds and return in 3 mo. Personal history of malignant neoplasm of prostate  Pre op exam and evaluation - risks versus benefits are acceptable for this patients above mentioned procedure for cysto and Bx  for hematuria and prostate Ca  1st BX 11/25/20 was inconclusive now for repeat  as per Dr Ynes Cummins to use C-pap     Tobacco use disorder  counseled again needs to DC     Weakness of both lower extremities  Cont to use cane for safety given handicap letter today for placard     GERD (gastroesophageal reflux disease)  Cont with diet  And meds     Chronic renal failure, stage 2 (mild)  Last labs stable     Preop examination  Pre op exam and evaluation - risks versus benefits are acceptable for this patients above mentioned procedure for cysto and Bx  for hematuria and prostate Ca  1st BX 11/25/20 was inconclusive now for repeat  as per Dr Maldonado Harper     45 minutes spent with patient and family/caregivers discussing diagnoses and plan; at least 50% of which was for care coordination.

## 2020-12-29 NOTE — LETTER
[facility] [address]  [address], [address] [place] [id number]  Phone: [phone]  Fax: [phone]    Dax Mata MD        December 29, 2020     Dr Ben Sue    Patient: Kelton Hendricks   MR Number: [id number]   YOB: 1950   Date of Visit: 12/29/2020       Dear Dr. Ben Sue: Thank you for referring Luda Vaughan to me for evaluation. Below are the relevant portions of my assessment and plan of care. Subjective:      Patient ID: Kelton Hendricks is a 65 y.o. male. HPI  Here today for pre op H&P for the following procedurePre op exam and evaluation -  for cysto and Bx  for hematuria and prostate Ca  1st BX 11/25/20 was inconclusive now for repeat  as per Dr Ben Sue      Hyperlipidemia  This is a chronic problem. The current episode started more than 1 year ago. The problem is controlled. Recent lipid tests were reviewed and are low. Exacerbating diseases include diabetes. Factors aggravating his hyperlipidemia include smoking. Current antihyperlipidemic treatment includes diet change, exercise, statins and fibric acid derivatives. The current treatment provides significant improvement of lipids. Compliance problems include adherence to diet and adherence to exercise. Risk factors for coronary artery disease include diabetes mellitus, dyslipidemia, hypertension, male sex and obesity. Hypertension  This is a chronic problem. The current episode started more than 1 year ago. The problem is unchanged. The problem is controlled. There are no associated agents to hypertension. Risk factors for coronary artery disease include diabetes mellitus, dyslipidemia, male gender and obesity. Past treatments include ACE inhibitors and lifestyle changes. The current treatment provides significant improvement. Compliance problems include diet and exercise.     Diabetes  lisinopril (PRINIVIL;ZESTRIL) 30 MG tablet Take 1 tablet by mouth daily      Dulaglutide 0.75 MG/0.5ML SOPN Inject 1.5 mg into the skin once a week 12 Pen 1    fish oil-omega-3 fatty acids (FISH OIL) 1000 MG capsule Take 1 g by mouth daily       aspirin 81 MG EC tablet Take 81 mg by mouth daily.  B-D ULTRA FINE LANCETS MISC by Does not apply route. 29 g x12mm       Blood Glucose Monitoring Suppl (ONE TOUCH ULTRA) by Does not apply route. Test strips       No current facility-administered medications for this visit. Past Medical History:   Diagnosis Date    Cancer [facility])     Prostate Tx with rads Dr Nathanael Keller Hyperlipidemia     Type II or unspecified type diabetes mellitus without mention of complication, not stated as uncontrolled     now seeing Dr Roberto Carlos Lewis       Family History   Problem Relation Age of Onset    Coronary Art Dis Father     Heart Disease Father     Diabetes Brother        Past Surgical History:   Procedure Laterality Date    APPENDECTOMY      COLONOSCOPY      normal     PROSTATE SURGERY      positive biopsy for cancer       Social History     Socioeconomic History    Marital status: Single     Spouse name: Not on file    Number of children: Not on file    Years of education: Not on file    Highest education level: Not on file   Occupational History    Not on file   Social Needs    Financial resource strain: Not on file    Food insecurity     Worry: Not on file     Inability: Not on file    Transportation needs     Medical: Not on file     Non-medical: Not on file   Tobacco Use    Smoking status: Former Smoker     Packs/day: 2.00     Years: 100.00     Pack years: 200.00     Start date: 1965     Quit date: 2018     Years since quittin.8    Smokeless tobacco: Never Used    Tobacco comment: has been trying to  try gum or patch -to try Chantix stopped then started again    Substance and Sexual Activity    Alcohol use: No    Drug use:  No  Sexual activity: Not on file   Lifestyle    Physical activity     Days per week: Not on file     Minutes per session: Not on file    Stress: Not on file   Relationships    Social connections     Talks on phone: Not on file     Gets together: Not on file     Attends Confucianism service: Not on file     Active member of club or organization: Not on file     Attends meetings of clubs or organizations: Not on file     Relationship status: Not on file    Intimate partner violence     Fear of current or ex partner: Not on file     Emotionally abused: Not on file     Physically abused: Not on file     Forced sexual activity: Not on file   Other Topics Concern    Not on file   Social History Narrative    Not on file       Review of Systems  Review of Systems   Constitutional: Positive for appetite change (decreased ) and unexpected weight change (down 10#). Negative for fatigue. HENT: Negative. Has abused Afrin for years off now    Eyes: Negative. Last exam was ok    Respiratory: Positive for apnea. Cont to use C-pap   Cont to smoke had neg. CT as noted    Cardiovascular: Negative. Gastrointestinal: Negative. Needs 10 year F/U with colonoscopy - cologuard neg 2019     Endocrine: Negative. Stable diabetic control    Genitourinary: Positive for frequency, hematuria and urgency. Cont with Dr Isabella Cerda  Hx of renal colic none since being on allopurinol  1995  S/P Prostate CA 2007 was Tx with rads -PSA increase again to see Dr Samson Leader  - now seeing Dr Isabella Cerda  For cysto and cont F/U for Prostate CA  And hematuria as noted    Musculoskeletal: Positive for arthralgias (knee c/o   has DC NSAIDS ). Skin: Negative. Allergic/Immunologic: Negative. Neurological: Positive for weakness. Negative for numbness. Hematological: Negative. Objective:   Physical Exam:  Physical Exam  Constitutional:       Appearance: He is well-developed.       Comments: Obese   HENT: Head: Normocephalic and atraumatic. Right Ear: External ear normal.      Left Ear: External ear normal.   Eyes:      Conjunctiva/sclera: Conjunctivae normal.      Pupils: Pupils are equal, round, and reactive to light. Neck:      Musculoskeletal: Normal range of motion and neck supple. Thyroid: No thyromegaly. Trachea: No tracheal deviation. Cardiovascular:      Rate and Rhythm: Normal rate and regular rhythm. Pulses: Normal pulses. Heart sounds: Normal heart sounds. No murmur. Pulmonary:      Effort: Pulmonary effort is normal.      Breath sounds: Normal breath sounds. Abdominal:      General: Abdomen is flat. Bowel sounds are normal.      Palpations: Abdomen is soft. Comments: obese   Genitourinary:     Comments:   Per Dr Moises Coles   Musculoskeletal: Normal range of motion. General: Tenderness (knees improved  uses a cane for stablity ) present. Skin:     General: Skin is warm and dry. Comments: Mult keratotic lesions of legs    Neurological:      General: No focal deficit present. Mental Status: He is alert and oriented to person, place, and time. Mental status is at baseline. Deep Tendon Reflexes: Reflexes are normal and symmetric. Comments: Lower ext weakness uses cane for safety      Psychiatric:         Mood and Affect: Mood normal.         Behavior: Behavior normal.         Thought Content:  Thought content normal.         /60 (Site: Right Upper Arm, Position: Sitting, Cuff Size: Medium Adult)   Pulse 66   Temp 96.8 °F (36 °C) (Temporal)   Resp 14   Ht 6' (1.829 m)   Wt (!) 333 lb 9.6 oz (151.3 kg)   BMI 45.24 kg/m²       Assessment & Plan:         Type 2 diabetes mellitus without complication (HCC)  Remains under good control cont diet and meds     Morbid obesity (HCC)  Weight down 10 #     Essential hypertension  Stable continue current meds and return in 3 mo.    needs to check BP at home     Mixed hyperlipidemia Stable continue current meds and return in 3 mo. Personal history of malignant neoplasm of prostate  Pre op exam and evaluation - risks versus benefits are acceptable for this patients above mentioned procedure for cysto and Bx  for hematuria and prostate Ca  1st BX 11/25/20 was inconclusive now for repeat  as per Dr Tom Alexander to use C-pap     Tobacco use disorder  counseled again needs to DC     Weakness of both lower extremities  Cont to use cane for safety given handicap letter today for placard     GERD (gastroesophageal reflux disease)  Cont with diet  And meds     Chronic renal failure, stage 2 (mild)  Last labs stable     Preop examination  Pre op exam and evaluation - risks versus benefits are acceptable for this patients above mentioned procedure for cysto and Bx  for hematuria and prostate Ca  1st BX 11/25/20 was inconclusive now for repeat  as per Dr Brittany Lynn     45 minutes spent with patient and family/caregivers discussing diagnoses and plan; at least 50% of which was for care coordination. If you have questions, please do not hesitate to call me. I look forward to following Stephenie Sierra along with you.     Sincerely,        Julio Posadas MD

## 2020-12-30 NOTE — PROGRESS NOTES
[facility] / [facility] ([facility]) [address], [facility]    Acknowledgment of Informed Consent for Surgical or Medical Procedure and Sedation  I agree to allow doctor(s) Laine Campos and his/her associates or assistants, including residents and/or other qualified medical practitioner to perform the following medical treatment or procedure and to administer or direct the administration of sedation as necessary:  Procedure(s): CYSTOSCOPY TRANSURETHRAL RESECTION OF BLADDER TUMOR  My doctor has explained the following regarding the proposed procedure:  ? the explanation of the procedure  ? the benefits of the procedure  ? the potential problems that might occur during recuperation  ? the risks and side effects of the procedure which could include but are not limited to severe blood loss, infection, stroke or death  ? the benefits, risks and side effect of alternative procedures including the consequences of declining this procedure or any alternative procedures  ? the likelihood of achieving satisfactory results. I acknowledge no guarantee or assurance has been made to me regarding the results. I understand that during the course of this treatment/procedure, unforeseen conditions can occur which require an additional or different procedure. I agree to allow my physician or assistants to perform such extension of the original procedure as they may find necessary. I understand that sedation will often result in temporary impairment of memory and fine motor skills and that sedation can occasionally progress to a state of deep sedation or general anesthesia. I understand the risks of anesthesia for surgery include, but are not limited to, sore throat, hoarseness, injury to face, mouth, or teeth; nausea; headache; injury to blood vessels or nerves; death, brain damage, or paralysis. I understand that if I have a Limitation of Treatment order in effect during my hospitalization, the order may or may not be in effect during this procedure. I give my doctor permission to give me blood or blood products. I understand that there are risks with receiving blood such as hepatitis, AIDS, fever, or allergic reaction. I acknowledge that the risks, benefits, and alternatives of this treatment have been explained to me and that no express or implied warranty has been given by the hospital, any blood bank, or any person or entity as to the blood or blood components transfused. At the discretion of my doctor, I agree to allow observers, equipment/product representatives and allow photographing, and/or televising of the procedure, provided my name or identity is maintained confidentially. I agree the hospital may dispose of or use for scientific or educational purposes any tissue, fluid, or body parts which may be removed.     ________________________________Date________Time______ am/pm  ([place])  Patient or Signature of Closest Relative or Legal Guardian    ________________________________Date________Time______am/pm      Page 1 of  1  Witness

## 2021-01-05 NOTE — PROGRESS NOTES
Pt advised to quarantine after Covid test. Pt states his brother, Shasta Zambrano will help at home.

## 2021-01-05 NOTE — PROGRESS NOTES
[address] [address] patients having surgery or anesthesia are required to be Covid tested. You will need to quarantined from the time you are tested until your surgery. PRIOR TO PROCEDURE DATE:  1. Please follow any guidelines/instructions prior to your procedure as advised by your surgeon. 2. Arrange for someone to drive you home and be with you for the first 24 hours after discharge for your safety after your procedure for which you received sedation. Ensure it is someone we can share information with regarding your discharge. 3. You must contact your surgeon for instructions IF:  ? You are taking any blood thinners, aspirin, anti-inflammatory or vitamin E.  ? There is a change in your physical condition such as a cold, fever, rash, cuts, sores or any other infection, especially near your surgical site. 4. Do not drink alcohol the day before or day of your procedure. 5. A Pre-op History and Physical for surgery MUST be completed by your Physician or Urgent Care within 30 days of your procedure date. Please bring a copy with you on the day of your procedure and along with any other testing performed. THE DAY OF YOUR PROCEDURE:  1. Follow instructions for ARRIVAL TIME as DIRECTED BY YOUR SURGEON. I    2. Enter the MAIN entrance from [employer] and follow the signs to the free [employer] or [employer] parking (offered free of charge 6am-5pm). 3. Enter the Main Entrance of the hospital (do not enter from the lower level of the parking garage). Upon entrance, check in with the  at the main desk on your left. If no one is available at the desk, proceed into the [facility] Waiting Room and go through the door directly into the [facility]. There is a Check-in desk ACROSS from Room 5 (marked with a sign hanging from the ceiling). The phone number for the surgery center is [phone]. 4. Please call [phone] option #2 option #2 if you have not been preregistered yet. On the day of your procedure bring your insurance card and photo ID. You will be registered at your bedside once brought back to your room. 5. DO NOT EAT ANYTHING eight hours prior to surgery. May have 8 ounces of water 4 hours prior to surgery. 6. MEDICATIONS   ? Take the following medications with a SMALL sip of water: orilosec, Trospium. Bring CPAP  ? Use your usual dose of inhalers the morning of surgery. BRING your rescue inhaler with you to hospital.   ? Anesthesia does NOT want you to take insulin the morning of surgery. They will control your blood sugar while you are at the hospital. Please contact your ordering physician for instructions regarding your insulin the night before your procedure. If you have an insulin pump, please keep it set on basal rate. 7. Do not swallow water when brushing teeth. No gum, candy, mints or ice chips. Refrain from smoking or at least decrease the amount. 8. Dress in loose, comfortable clothing appropriate for redressing after your procedure. Do not wear jewelry (including body piercings), make-up (especially NO eye make-up), fingernail polish (NO toenail polish if foot/leg surgery), lotion, powders or metal hairclips. 9. Dentures, glasses, or contacts will need to be removed before your procedure. Bring cases for your glasses, contacts, dentures, or hearing aids to protect them while you are in surgery. 10. If you use a CPAP, please bring it with you on the day of your procedure. 11. We recommend that valuable personal  belongings such as cash, cell phones, e-tablets or jewelry, be left at home during your stay. The hospital will not be responsible for valuables that are not secured in the hospital safe. However, if your insurance requires a co-pay, you may want to bring a method of payment, i.e. Check or credit card, if you wish to pay your co-pay the day of surgery. 12. If you are to stay overnight, you may bring a bag with personal items. Please have any large items you may need brought in by your family after your arrival to your hospital room. 15. If you have a Living Will or Durable Power of , please bring a copy on the day of your procedure. 15. With your permission, one family member may accompany you while you are being prepared for surgery. Once you are ready, additional family members may join you. HOW WE KEEP YOU SAFE and WORK TO PREVENT SURGICAL SITE INFECTIONS:  1. Health care workers should always check your ID bracelet to verify your name and birth date. You will be asked many times to state your name, date of birth, and allergies. 2. Health care workers should always clean their hands with soap or alcohol gel before providing care to you. It is okay to ask anyone if they cleaned their hands before they touch you. 3. You will be actively involved in verifying the type of procedure you are having and ensuring the correct surgical site. This will be confirmed multiple times prior to your procedure. Do NOT lele your surgery site UNLESS instructed to by your surgeon. 4. Do not shave or wax for 72 hours prior to procedure near your operative site. Shaving with a razor can irritate your skin and make it easier to develop an infection. On the day of your procedure, any hair that needs to be removed near the surgical site will be clipped by a healthcare worker using a special clippers designed to avoid skin irritation. 5. When you are in the operating room, your surgical site will be cleansed with a special soap, and in most cases, you will be given an antibiotic before the surgery begins. What to expect AFTER YOUR PROCEDURE:  1. Immediately following your procedure, your will be taken to the PACU for the first phase of your recovery. Your nurse will help you recover from any potential side effects of anesthesia, such as extreme drowsiness, changes in your vital signs or breathing patterns. Nausea, headache, muscle aches, or sore throat may also occur after anesthesia. Your nurse will help you manage these potential side effects. 2. For comfort and safety, arrange to have someone at home with you for the first 24 hours after discharge. 3. You and your family will be given written instructions about your diet, activity, dressing care, medications, and return visits. 4. Once at home, should issues with nausea, pain, or bleeding occur, or should you notice any signs of infection, you should call your surgeon. 5. Always clean your hands before and after caring for your wound. Do not let your family touch your surgery site without cleaning their hands. 6. Narcotic pain medications can cause significant constipation. You may want to add a stool softener to your postoperative medication schedule or speak to your surgeon on how best to manage this SIDE EFFECT. SPECIAL INSTRUCTIONS     Thank you for allowing us to care for you. We strive to exceed your expectations in the delivery of care and service provided to you and your family. If you need to contact us for any reason, please call us at [phone]    Instructions reviewed with patient during preadmission testing phone interview. Nancy Parnell. 1/5/2021 .8:37 AM      ADDITIONAL EDUCATIONAL INFORMATION REVIEWED PER PHONE WITH YOU AND/OR YOUR FAMILY:  No Bring a urine sample on day of surgery  No Hibiclens® Bathing Instructions   Yes Antibacterial Soap

## 2021-01-06 ENCOUNTER — NURSE ONLY (OUTPATIENT)
Dept: PRIMARY CARE CLINIC | Age: 71
End: 2021-01-06
Payer: MEDICARE

## 2021-01-06 DIAGNOSIS — Z01.818 PREOP EXAMINATION: Primary | ICD-10-CM

## 2021-01-06 PROCEDURE — 99211 OFF/OP EST MAY X REQ PHY/QHP: CPT | Performed by: NURSE PRACTITIONER

## 2021-01-07 LAB — SARS-COV-2, NAA: NOT DETECTED

## 2021-01-11 ENCOUNTER — ANESTHESIA EVENT (OUTPATIENT)
Dept: OPERATING ROOM | Age: 71
End: 2021-01-11
Payer: MEDICARE

## 2021-01-12 ENCOUNTER — ANESTHESIA (OUTPATIENT)
Dept: OPERATING ROOM | Age: 71
End: 2021-01-12
Payer: MEDICARE

## 2021-01-12 ENCOUNTER — HOSPITAL ENCOUNTER (OUTPATIENT)
Age: 71
Setting detail: OUTPATIENT SURGERY
Discharge: HOME | End: 2021-01-12
Attending: UROLOGY | Admitting: UROLOGY
Payer: MEDICARE

## 2021-01-12 VITALS
TEMPERATURE: 97.1 F | DIASTOLIC BLOOD PRESSURE: 76 MMHG | RESPIRATION RATE: 20 BRPM | WEIGHT: 315 LBS | SYSTOLIC BLOOD PRESSURE: 128 MMHG | HEIGHT: 74 IN | OXYGEN SATURATION: 94 % | HEART RATE: 98 BPM | BODY MASS INDEX: 40.43 KG/M2

## 2021-01-12 VITALS
SYSTOLIC BLOOD PRESSURE: 104 MMHG | RESPIRATION RATE: 19 BRPM | TEMPERATURE: 62.1 F | DIASTOLIC BLOOD PRESSURE: 57 MMHG | OXYGEN SATURATION: 97 %

## 2021-01-12 DIAGNOSIS — R31.0 GROSS HEMATURIA: ICD-10-CM

## 2021-01-12 LAB
GLUCOSE BLD-MCNC: 127 MG/DL (ref 70–99)
GLUCOSE BLD-MCNC: 164 MG/DL (ref 70–99)
PERFORMED ON: ABNORMAL
PERFORMED ON: ABNORMAL

## 2021-01-12 PROCEDURE — 2580000003 HC RX 258: Performed by: UROLOGY

## 2021-01-12 PROCEDURE — 7100000000 HC PACU RECOVERY - FIRST 15 MIN: Performed by: UROLOGY

## 2021-01-12 PROCEDURE — 3700000000 HC ANESTHESIA ATTENDED CARE: Performed by: UROLOGY

## 2021-01-12 PROCEDURE — 2500000003 HC RX 250 WO HCPCS: Performed by: NURSE ANESTHETIST, CERTIFIED REGISTERED

## 2021-01-12 PROCEDURE — 7100000011 HC PHASE II RECOVERY - ADDTL 15 MIN: Performed by: UROLOGY

## 2021-01-12 PROCEDURE — 3700000001 HC ADD 15 MINUTES (ANESTHESIA): Performed by: UROLOGY

## 2021-01-12 PROCEDURE — 7100000010 HC PHASE II RECOVERY - FIRST 15 MIN: Performed by: UROLOGY

## 2021-01-12 PROCEDURE — 88307 TISSUE EXAM BY PATHOLOGIST: CPT

## 2021-01-12 PROCEDURE — 3600000014 HC SURGERY LEVEL 4 ADDTL 15MIN: Performed by: UROLOGY

## 2021-01-12 PROCEDURE — 88342 IMHCHEM/IMCYTCHM 1ST ANTB: CPT

## 2021-01-12 PROCEDURE — 6360000002 HC RX W HCPCS: Performed by: UROLOGY

## 2021-01-12 PROCEDURE — 6360000002 HC RX W HCPCS: Performed by: NURSE ANESTHETIST, CERTIFIED REGISTERED

## 2021-01-12 PROCEDURE — 2709999900 HC NON-CHARGEABLE SUPPLY: Performed by: UROLOGY

## 2021-01-12 PROCEDURE — 7100000001 HC PACU RECOVERY - ADDTL 15 MIN: Performed by: UROLOGY

## 2021-01-12 PROCEDURE — 2580000003 HC RX 258: Performed by: ANESTHESIOLOGY

## 2021-01-12 PROCEDURE — 3600000004 HC SURGERY LEVEL 4 BASE: Performed by: UROLOGY

## 2021-01-12 PROCEDURE — 88341 IMHCHEM/IMCYTCHM EA ADD ANTB: CPT

## 2021-01-12 RX ORDER — MIDAZOLAM HYDROCHLORIDE 1 MG/ML
INJECTION INTRAMUSCULAR; INTRAVENOUS PRN
Status: DISCONTINUED | OUTPATIENT
Start: 2021-01-12 | End: 2021-01-12 | Stop reason: SDUPTHER

## 2021-01-12 RX ORDER — ONDANSETRON 2 MG/ML
4 INJECTION INTRAMUSCULAR; INTRAVENOUS
Status: DISCONTINUED | OUTPATIENT
Start: 2021-01-12 | End: 2021-01-12 | Stop reason: HOSPADM

## 2021-01-12 RX ORDER — LIDOCAINE HYDROCHLORIDE 20 MG/ML
INJECTION, SOLUTION EPIDURAL; INFILTRATION; INTRACAUDAL; PERINEURAL PRN
Status: DISCONTINUED | OUTPATIENT
Start: 2021-01-12 | End: 2021-01-12 | Stop reason: SDUPTHER

## 2021-01-12 RX ORDER — SODIUM CHLORIDE 0.9 % (FLUSH) 0.9 %
10 SYRINGE (ML) INJECTION PRN
Status: DISCONTINUED | OUTPATIENT
Start: 2021-01-12 | End: 2021-01-12 | Stop reason: HOSPADM

## 2021-01-12 RX ORDER — FENTANYL CITRATE 50 UG/ML
50 INJECTION, SOLUTION INTRAMUSCULAR; INTRAVENOUS EVERY 5 MIN PRN
Status: DISCONTINUED | OUTPATIENT
Start: 2021-01-12 | End: 2021-01-12 | Stop reason: HOSPADM

## 2021-01-12 RX ORDER — CIPROFLOXACIN 2 MG/ML
400 INJECTION, SOLUTION INTRAVENOUS ONCE
Status: COMPLETED | OUTPATIENT
Start: 2021-01-12 | End: 2021-01-12

## 2021-01-12 RX ORDER — LABETALOL 20 MG/4 ML (5 MG/ML) INTRAVENOUS SYRINGE
5 EVERY 10 MIN PRN
Status: DISCONTINUED | OUTPATIENT
Start: 2021-01-12 | End: 2021-01-12 | Stop reason: HOSPADM

## 2021-01-12 RX ORDER — SUCCINYLCHOLINE CHLORIDE 20 MG/ML
INJECTION INTRAMUSCULAR; INTRAVENOUS PRN
Status: DISCONTINUED | OUTPATIENT
Start: 2021-01-12 | End: 2021-01-12 | Stop reason: SDUPTHER

## 2021-01-12 RX ORDER — PROPOFOL 10 MG/ML
INJECTION, EMULSION INTRAVENOUS PRN
Status: DISCONTINUED | OUTPATIENT
Start: 2021-01-12 | End: 2021-01-12 | Stop reason: SDUPTHER

## 2021-01-12 RX ORDER — FENTANYL CITRATE 50 UG/ML
INJECTION, SOLUTION INTRAMUSCULAR; INTRAVENOUS PRN
Status: DISCONTINUED | OUTPATIENT
Start: 2021-01-12 | End: 2021-01-12 | Stop reason: SDUPTHER

## 2021-01-12 RX ORDER — HYDROMORPHONE HCL 110MG/55ML
PATIENT CONTROLLED ANALGESIA SYRINGE INTRAVENOUS PRN
Status: DISCONTINUED | OUTPATIENT
Start: 2021-01-12 | End: 2021-01-12 | Stop reason: SDUPTHER

## 2021-01-12 RX ORDER — ENALAPRILAT 2.5 MG/2ML
1.25 INJECTION INTRAVENOUS
Status: DISCONTINUED | OUTPATIENT
Start: 2021-01-12 | End: 2021-01-12 | Stop reason: HOSPADM

## 2021-01-12 RX ORDER — ONDANSETRON 2 MG/ML
INJECTION INTRAMUSCULAR; INTRAVENOUS PRN
Status: DISCONTINUED | OUTPATIENT
Start: 2021-01-12 | End: 2021-01-12 | Stop reason: SDUPTHER

## 2021-01-12 RX ORDER — SODIUM CHLORIDE 0.9 % (FLUSH) 0.9 %
10 SYRINGE (ML) INJECTION EVERY 12 HOURS SCHEDULED
Status: DISCONTINUED | OUTPATIENT
Start: 2021-01-12 | End: 2021-01-12 | Stop reason: HOSPADM

## 2021-01-12 RX ORDER — LIDOCAINE HYDROCHLORIDE 10 MG/ML
1 INJECTION, SOLUTION EPIDURAL; INFILTRATION; INTRACAUDAL; PERINEURAL
Status: DISCONTINUED | OUTPATIENT
Start: 2021-01-12 | End: 2021-01-12 | Stop reason: HOSPADM

## 2021-01-12 RX ORDER — HYDRALAZINE HYDROCHLORIDE 20 MG/ML
5 INJECTION INTRAMUSCULAR; INTRAVENOUS EVERY 5 MIN PRN
Status: DISCONTINUED | OUTPATIENT
Start: 2021-01-12 | End: 2021-01-12 | Stop reason: HOSPADM

## 2021-01-12 RX ORDER — MAGNESIUM HYDROXIDE 1200 MG/15ML
LIQUID ORAL PRN
Status: DISCONTINUED | OUTPATIENT
Start: 2021-01-12 | End: 2021-01-12 | Stop reason: ALTCHOICE

## 2021-01-12 RX ORDER — SODIUM CHLORIDE, SODIUM LACTATE, POTASSIUM CHLORIDE, CALCIUM CHLORIDE 600; 310; 30; 20 MG/100ML; MG/100ML; MG/100ML; MG/100ML
INJECTION, SOLUTION INTRAVENOUS CONTINUOUS
Status: DISCONTINUED | OUTPATIENT
Start: 2021-01-12 | End: 2021-01-12 | Stop reason: HOSPADM

## 2021-01-12 RX ORDER — FENTANYL CITRATE 50 UG/ML
25 INJECTION, SOLUTION INTRAMUSCULAR; INTRAVENOUS EVERY 5 MIN PRN
Status: DISCONTINUED | OUTPATIENT
Start: 2021-01-12 | End: 2021-01-12 | Stop reason: HOSPADM

## 2021-01-12 RX ORDER — ROCURONIUM BROMIDE 10 MG/ML
INJECTION, SOLUTION INTRAVENOUS PRN
Status: DISCONTINUED | OUTPATIENT
Start: 2021-01-12 | End: 2021-01-12 | Stop reason: SDUPTHER

## 2021-01-12 RX ADMIN — FENTANYL CITRATE 50 MCG: 50 INJECTION INTRAMUSCULAR; INTRAVENOUS at 10:52

## 2021-01-12 RX ADMIN — CIPROFLOXACIN 400 MG: 2 INJECTION, SOLUTION INTRAVENOUS at 10:46

## 2021-01-12 RX ADMIN — PROPOFOL 150 MG: 10 INJECTION, EMULSION INTRAVENOUS at 10:40

## 2021-01-12 RX ADMIN — SODIUM CHLORIDE, POTASSIUM CHLORIDE, SODIUM LACTATE AND CALCIUM CHLORIDE: 600; 310; 30; 20 INJECTION, SOLUTION INTRAVENOUS at 08:38

## 2021-01-12 RX ADMIN — LIDOCAINE HYDROCHLORIDE 50 MG: 20 INJECTION, SOLUTION EPIDURAL; INFILTRATION; INTRACAUDAL; PERINEURAL at 10:40

## 2021-01-12 RX ADMIN — MIDAZOLAM HYDROCHLORIDE 2 MG: 2 INJECTION, SOLUTION INTRAMUSCULAR; INTRAVENOUS at 10:31

## 2021-01-12 RX ADMIN — FENTANYL CITRATE 50 MCG: 50 INJECTION INTRAMUSCULAR; INTRAVENOUS at 10:40

## 2021-01-12 RX ADMIN — SUGAMMADEX 200 MG: 100 INJECTION, SOLUTION INTRAVENOUS at 11:40

## 2021-01-12 RX ADMIN — ROCURONIUM BROMIDE 50 MG: 10 INJECTION INTRAVENOUS at 10:52

## 2021-01-12 RX ADMIN — HYDROMORPHONE HYDROCHLORIDE 1 MG: 2 INJECTION, SOLUTION INTRAMUSCULAR; INTRAVENOUS; SUBCUTANEOUS at 11:06

## 2021-01-12 RX ADMIN — SODIUM CHLORIDE, POTASSIUM CHLORIDE, SODIUM LACTATE AND CALCIUM CHLORIDE: 600; 310; 30; 20 INJECTION, SOLUTION INTRAVENOUS at 11:06

## 2021-01-12 RX ADMIN — HYDROMORPHONE HYDROCHLORIDE 1 MG: 2 INJECTION, SOLUTION INTRAMUSCULAR; INTRAVENOUS; SUBCUTANEOUS at 11:26

## 2021-01-12 RX ADMIN — SUCCINYLCHOLINE CHLORIDE 180 MG: 20 INJECTION, SOLUTION INTRAMUSCULAR; INTRAVENOUS; PARENTERAL at 10:41

## 2021-01-12 RX ADMIN — ONDANSETRON 4 MG: 2 INJECTION INTRAMUSCULAR; INTRAVENOUS at 11:40

## 2021-01-12 ASSESSMENT — PULMONARY FUNCTION TESTS
PIF_VALUE: 17
PIF_VALUE: 19
PIF_VALUE: 32
PIF_VALUE: 20
PIF_VALUE: 19
PIF_VALUE: 19
PIF_VALUE: 1
PIF_VALUE: 5
PIF_VALUE: 19
PIF_VALUE: 24
PIF_VALUE: 19
PIF_VALUE: 19
PIF_VALUE: 18
PIF_VALUE: 19
PIF_VALUE: 2
PIF_VALUE: 19
PIF_VALUE: 50
PIF_VALUE: 19
PIF_VALUE: 1
PIF_VALUE: 6
PIF_VALUE: 19
PIF_VALUE: 17
PIF_VALUE: 20
PIF_VALUE: 0
PIF_VALUE: 19
PIF_VALUE: 2
PIF_VALUE: 0
PIF_VALUE: 20
PIF_VALUE: 0
PIF_VALUE: 19
PIF_VALUE: 19
PIF_VALUE: 20
PIF_VALUE: 16
PIF_VALUE: 19
PIF_VALUE: 17
PIF_VALUE: 19
PIF_VALUE: 19
PIF_VALUE: 20
PIF_VALUE: 19
PIF_VALUE: 19
PIF_VALUE: 20

## 2021-01-12 ASSESSMENT — PAIN SCALES - GENERAL: PAINLEVEL_OUTOF10: 0

## 2021-01-12 NOTE — H&P
Mariya Uriel    [id number]    [facility] Same Day Surgery Update H & P  Department of General Surgery   Surgical Service   Pre-operative History and Physical  Last H & P within the last 30 days. DIAGNOSIS:   Gross hematuria [R31.0]    Procedure(s):  CYSTOSCOPY TRANSURETHRAL RESECTION OF BLADDER TUMOR     HISTORY OF PRESENT ILLNESS:   Patient is a morbidly obese (Body mass index is 41.09 kg/m².), 65 y.o. male with c/o gross hematuria presents today for the above procedure. Covid 19:  Patient denies fever, chills, cough or known exposure to Covid-19.        Past Medical History:        Diagnosis Date    Cancer [facility]) 2007    Prostate Tx with rads Dr Carmelo Mckinney Hematuria     Hyperlipidemia     Hypertension     Sleep apnea     uses CPAP    Type II or unspecified type diabetes mellitus without mention of complication, not stated as uncontrolled     now seeing Dr Biju Angeles     Past Surgical History:        Procedure Laterality Date    APPENDECTOMY     [facility]    normal     PROSTATE SURGERY  2007    positive biopsy for cancer     Past Social History:  Social History     Socioeconomic History    Marital status: Single     Spouse name: None    Number of children: None    Years of education: None    Highest education level: None   Occupational History    None   Social Needs    Financial resource strain: None    Food insecurity     Worry: None     Inability: None    Transportation needs     Medical: None     Non-medical: None   Tobacco Use    Smoking status: Current Every Day Smoker     Packs/day: 2.00     Years: 50.00     Pack years: 100.00     Start date: 4/4/1965    Smokeless tobacco: Never Used    Tobacco comment: has been trying to  try gum or patch -to try Chantix stopped then started again    Substance and Sexual Activity    Alcohol use: No    Drug use: No    Sexual activity: None   Lifestyle    Physical activity     Days per week: None     Minutes per session: None  Stress: None   Relationships    Social connections     Talks on phone: None     Gets together: None     Attends Jain service: None     Active member of club or organization: None     Attends meetings of clubs or organizations: None     Relationship status: None    Intimate partner violence     Fear of current or ex partner: None     Emotionally abused: None     Physically abused: None     Forced sexual activity: None   Other Topics Concern    None   Social History Narrative    None         Medications Prior to Admission:      Prior to Admission medications    Medication Sig Start Date End Date Taking?  Authorizing Provider   Methen-Hyosc-Meth Blue-Na Phos (UROGESIC-BLUE) 81.6 MG TABS TAKE 1 TABLET BY MOUTH 3 TIMES A DAY AS NEEDED FOR BLADDER PAIN AND BURNING 12/10/20  Yes Historical Provider, MD   trospium (SANCTURA) 20 MG tablet Take 1 tablet by mouth 2 times daily 12/29/20  Yes Anthony Alan MD   atorvastatin (LIPITOR) 40 MG tablet TAKE ONE TABLET BY MOUTH DAILY 11/23/20  Yes Anthony Alan MD   gabapentin (NEURONTIN) 300 MG capsule TAKE ONE CAPSULE BY MOUTH THREE TIMES A DAY 10/20/20 1/5/21 Yes Anthony Alan MD   allopurinol (ZYLOPRIM) 300 MG tablet TAKE ONE TABLET BY MOUTH DAILY 9/28/20  Yes Anthony Alan MD   metFORMIN (GLUCOPHAGE) 500 MG tablet TAKE TWO TABLETS BY MOUTH TWICE A DAY 9/28/20  Yes Anthony Alan MD   omeprazole (PRILOSEC) 40 MG delayed release capsule TAKE ONE CAPSULE BY MOUTH DAILY 8/31/20  Yes Anthony Alan MD   pioglitazone (ACTOS) 15 MG tablet Take 15 mg by mouth daily 12/30/17  Yes Historical Provider, MD   Cholecalciferol (VITAMIN D3) 1000 units CAPS Take by mouth 2 times daily   Yes Historical Provider, MD   lisinopril (PRINIVIL;ZESTRIL) 30 MG tablet Take 1 tablet by mouth daily 4/4/17  Yes Anthony Alan MD   Dulaglutide 0.75 MG/0.5ML SOPN Inject 1.5 mg into the skin once a week 4/4/17  Yes Anthony Alan MD fish oil-omega-3 fatty acids (FISH OIL) 1000 MG capsule Take 1 g by mouth daily    Yes Historical Provider, MD   aspirin 81 MG EC tablet Take 81 mg by mouth daily. Yes Historical Provider, MD   insulin glargine (TOUJEO SOLOSTAR) 300 UNIT/ML injection pen Inject 10 Units into the skin nightly     Historical Provider, MD   B-D ULTRA FINE LANCETS MISC by Does not apply route. 29 g x12mm     Historical Provider, MD   Blood Glucose Monitoring Suppl (ONE TOUCH ULTRA) by Does not apply route. Test strips    Historical Provider, MD         Allergies:  Patient has no known allergies. PHYSICAL EXAM:      /76   Pulse 108   Temp 98 °F (36.7 °C) (Oral)   Resp 16   Ht 6' 2\" (1.88 m)   Wt (!) 320 lb (145.2 kg)   SpO2 95%   BMI 41.09 kg/m²      Airway:  Airway patent with no audible stridor    Heart:  regular rate and rhythm, No murmur noted    Lungs:  No increased work of breathing, good air exchange, clear to auscultation bilaterally, no crackles or wheezing    Abdomen:  soft, non-distended, non-tender, no rebound tenderness or guarding, normal active bowel sounds and no masses palpated    ASSESSMENT AND PLAN     Patient is a 65 y.o. male with above specified procedure planned. 1.  Patient seen and focused exam done today- no new changes since last physical exam on 12/29/20    2. Access to ancillary services are available per request of the provider.     Sravan Sandoval, MANISHA - CNP     1/12/2021

## 2021-01-12 NOTE — ANESTHESIA PRE PROCEDURE
Department of Anesthesiology  Preprocedure Note       Name:  Jetta Carrel   Age:  65 y.o.  :  1950                                          MRN:  [id number]         Date:  2021      Surgeon: Rabia Conti):  Veronica Tirado MD    Procedure: Procedure(s):  CYSTOSCOPY TRANSURETHRAL RESECTION OF BLADDER TUMOR    Medications prior to admission:   Prior to Admission medications    Medication Sig Start Date End Date Taking? Authorizing Provider   Methen-Hyosc-Meth Blue-Na Phos (UROGESIC-BLUE) 81.6 MG TABS TAKE 1 TABLET BY MOUTH 3 TIMES A DAY AS NEEDED FOR BLADDER PAIN AND BURNING 12/10/20  Yes Historical Provider, MD   trospium (SANCTURA) 20 MG tablet Take 1 tablet by mouth 2 times daily 20  Yes Indu Gr MD   atorvastatin (LIPITOR) 40 MG tablet TAKE ONE TABLET BY MOUTH DAILY 20  Yes Indu Gr MD   gabapentin (NEURONTIN) 300 MG capsule TAKE ONE CAPSULE BY MOUTH THREE TIMES A DAY 10/20/20 1/5/21 Yes Indu Gr MD   allopurinol (ZYLOPRIM) 300 MG tablet TAKE ONE TABLET BY MOUTH DAILY 20  Yes Indu Gr MD   metFORMIN (GLUCOPHAGE) 500 MG tablet TAKE TWO TABLETS BY MOUTH TWICE A DAY 20  Yes Indu Gr MD   omeprazole (PRILOSEC) 40 MG delayed release capsule TAKE ONE CAPSULE BY MOUTH DAILY 20  Yes Indu Gr MD   pioglitazone (ACTOS) 15 MG tablet Take 15 mg by mouth daily 17  Yes Historical Provider, MD   Cholecalciferol (VITAMIN D3) 1000 units CAPS Take by mouth 2 times daily   Yes Historical Provider, MD   lisinopril (PRINIVIL;ZESTRIL) 30 MG tablet Take 1 tablet by mouth daily 17  Yes Indu Gr MD   Dulaglutide 0.75 MG/0.5ML SOPN Inject 1.5 mg into the skin once a week 17  Yes Indu Gr MD   fish oil-omega-3 fatty acids (FISH OIL) 1000 MG capsule Take 1 g by mouth daily    Yes Historical Provider, MD   aspirin 81 MG EC tablet Take 81 mg by mouth daily.    Yes Historical Provider, MD  APPENDECTOMY      COLONOSCOPY  1998    normal     PROSTATE SURGERY  2007    positive biopsy for cancer       Social History:    Social History     Tobacco Use    Smoking status: Current Every Day Smoker     Packs/day: 2.00     Years: 50.00     Pack years: 100.00     Start date: 4/4/1965    Smokeless tobacco: Never Used    Tobacco comment: has been trying to  try gum or patch -to try Chantix stopped then started again    Substance Use Topics    Alcohol use: No                                Ready to quit: Not Answered  Counseling given: Not Answered  Comment: has been trying to  try gum or patch -to try Chantix stopped then started again       Vital Signs (Current):   Vitals:    01/05/21 0832 01/12/21 0808   BP:  135/76   Pulse:  108   Resp:  16   Temp:  98 °F (36.7 °C)   TempSrc:  Oral   SpO2:  95%   Weight: (!) 320 lb (145.2 kg)    Height: 6' 2\" (1.88 m)                                               BP Readings from Last 3 Encounters:   01/12/21 135/76   12/29/20 122/60   11/25/20 116/60       NPO Status: Time of last liquid consumption: 2100                        Time of last solid consumption: 2100                        Date of last liquid consumption: 01/11/21                        Date of last solid food consumption: 01/11/21    BMI:   Wt Readings from Last 3 Encounters:   01/05/21 (!) 320 lb (145.2 kg)   12/29/20 (!) 333 lb 9.6 oz (151.3 kg)   11/25/20 (!) 343 lb 6.4 oz (155.8 kg)     Body mass index is 41.09 kg/m².     CBC:   Lab Results   Component Value Date    WBC 7.9 11/25/2020    RBC 4.70 11/25/2020    HGB 12.5 11/25/2020    HCT 39.2 11/25/2020    MCV 83.5 11/25/2020    RDW 18.4 11/25/2020     11/25/2020       CMP:   Lab Results   Component Value Date     11/25/2020    K 4.5 11/25/2020    CL 99 11/25/2020    CO2 25 11/25/2020    BUN 20 11/25/2020    CREATININE 0.9 11/25/2020    GFRAA >60 11/25/2020    GFRAA >60 06/12/2013    AGRATIO 1.4 11/25/2020    LABGLOM >60 11/25/2020 GLUCOSE 121 11/25/2020    PROT 6.7 11/25/2020    PROT 7.0 06/13/2012    CALCIUM 9.9 11/25/2020    BILITOT 0.4 11/25/2020    ALKPHOS 134 11/25/2020    AST 10 11/25/2020    ALT 6 11/25/2020       POC Tests: No results for input(s): POCGLU, POCNA, POCK, POCCL, POCBUN, POCHEMO, POCHCT in the last 72 hours. Coags: No results found for: PROTIME, INR, APTT    HCG (If Applicable): No results found for: PREGTESTUR, PREGSERUM, HCG, HCGQUANT     ABGs: No results found for: PHART, PO2ART, YLL2UTJ, JHQ6BHW, BEART, J2BQQXDV     Type & Screen (If Applicable):  No results found for: LABABO, LABRH    Drug/Infectious Status (If Applicable):  No results found for: HIV, HEPCAB    COVID-19 Screening (If Applicable):   Lab Results   Component Value Date    COVID19 NOT DETECTED 01/06/2021         Anesthesia Evaluation  Patient summary reviewed no history of anesthetic complications:   Airway: Mallampati: III  TM distance: >3 FB   Neck ROM: full  Mouth opening: > = 3 FB Dental: normal exam         Pulmonary:   (+) sleep apnea: on CPAP,                            ROS comment: Smokes 2 ppd   100 pk yrs    Cardiovascular:    (+) hypertension:,                   Neuro/Psych:               GI/Hepatic/Renal:   (+) GERD:, renal disease: CRI, morbid obesity (BMI 41)         ROS comment:  Hx of prostate ca around 13 yrs ago . Endo/Other:    (+) Diabetes, . Abdominal:           Vascular:                                        Anesthesia Plan      general     ASA 3       Induction: intravenous. Anesthetic plan and risks discussed with patient.                       Adelita Foster MD   1/12/2021

## 2021-01-12 NOTE — PROGRESS NOTES
PACU Transfer to [facility]    Vitals:    01/12/21 1303   BP: (!) 151/74   Pulse: 100   Resp: 19   Temp: 97 °F (36.1 °C)   SpO2: (!) 88%   See NOTES.        Intake/Output Summary (Last 24 hours) at 1/12/2021 1304  Last data filed at 1/12/2021 1304  Gross per 24 hour   Intake 1600 ml   Output 0 ml   Net 1600 ml       Pain assessment:  present - adequately treated       Patient transferred to care of AURELIO RN.    1/12/2021 1:04 PM

## 2021-01-12 NOTE — PROGRESS NOTES
Patient admitted to pacu post 1850 [address] with Dr. Laura Hwang. Patient connected to bedside monitors; VSS. Per CRNA patient was stable intraop. Patient was unresponsive with oral and nasal airway.  Both were removed without complications shortly after arrival.

## 2021-01-12 NOTE — PROGRESS NOTES
Dr. Tacho Salinas called regarding patient's O2. Patient is not struggling to breathe and is awake and alert with good coloring. With incentive spirometer use oxygen comes up but decreases to 88-90%. Because of patients long smoking history patient is advised to wear CPAP. Dr. Tacho Salinas ok with progressing.

## 2021-01-12 NOTE — PROGRESS NOTES
Patient is awake and alert. Has persistent non productive cough and post nasal drip. Patient is an every day smoker with sleep apnea. Oxygen decreases to about 88%. Spirometer used. Patient tolerating well.

## 2021-01-12 NOTE — ANESTHESIA POSTPROCEDURE EVALUATION
Department of Anesthesiology  Postprocedure Note    Patient: Patty Hickman  MRN: [id number]  YOB: 1950  Date of evaluation: 1/12/2021  Time:  1:36 PM     Procedure Summary     Date: 01/12/21 Room / Location: [address] [place] [address]    Anesthesia Start: 1031 Anesthesia Stop: 3433    Procedure: CYSTOSCOPY BLADDER BIOPSY (N/A ) Diagnosis:       Gross hematuria      (Gross hematuria [R31.0])    Surgeons: Sina Bettencourt MD Responsible Provider: Susu Jang MD    Anesthesia Type: general ASA Status: 3          Anesthesia Type: general    Reva Phase I: Reva Score: 9    Reva Phase II:      Last vitals: Reviewed and per EMR flowsheets.        Anesthesia Post Evaluation    Patient location during evaluation: bedside  Patient participation: complete - patient participated  Level of consciousness: awake  Airway patency: patent  Nausea & Vomiting: no nausea and no vomiting  Complications: no  Cardiovascular status: hemodynamically stable  Respiratory status: acceptable  Hydration status: stable

## 2021-01-12 NOTE — PROGRESS NOTES
Ambulatory Surgery/Procedure Discharge Note    Vitals:    01/12/21 1351   BP: 128/76   Pulse: 98   Resp: 20   Temp: 97.1 °F (36.2 °C)   SpO2: 94%   pt met criteria for discharge per Reva score. In: 1600 [I.V.:1600]  Out: 575 [Urine:575]    Restroom use offered before discharge. Yes-marie catheter emptied    Pain assessment:  none  Pain Level: 0      Pt alert and oriented x4. IV removed. Denies N/V or pain. Pt with marie catheter. Draining pink urine, no clots noted. 575ml emptied before discharge. Discharge instructions given to pt and brother Win Smith with pt permission. Reviewed marie catheter care and instructions. Reviewed follow up information and medication information. Left with all belongings and discharge paperwork. Patient discharged to home/self care.  Patient discharged via wheel chair by transporter to waiting family/S.O.       1/12/2021 2:36 PM

## 2021-01-12 NOTE — OP NOTE
DATE OF SURGERY: 1/12/2021  SURGEON: Fabiola Glasgow    PRE OP DIAGNOSIS: Bladder tumor    POST OP DIAGNOSIS: Same    PROCEDURES PERFORMED:  Cystoscopy with bladder biopsy     INDICATIONS FOR PROCEDURE: The patient is a 65 y.o. male with history of prostate cancer treated with radiation therapy who has known biochemical recurrence. He recently saw me for gross hematuria and underwent prior benign CT Urogram.  However, at the time of cystoscopy, I was concerned about tumor at the bladder dome. His prior surgery was being conducted at an outpatient surgery center. The location of the tumor created very high risk for an intra-peritoneal bladder rupture, so I simply took 2 cold cup biopsy samples of the area to try to achieve diagnosis without causing injury. Pathology returned benign, which I very much doubt to be the case. Therefore, I have counseled the patient for repeat biopsy today at the hospital with ability to perform open repair, should a perforation occur. He agrees and wishes to proceed. DESCRIPTION OF PROCEDURE:  After informed consent the patient was taken to the operating room and placed under general endotracheal anesthesia. The patient was prepped and draped in the standard surgical fashion in the dorsal lithotomy position. A 21 Fr rigid cystoscope was placed per urethra into the bladder and the bladder was inspected. Once again, I noted a large sessile tumor at the bladder dome and extending to the right and left lateral bladder walls. The remainder of the bladder was without concerning pathology. I decided to use cold cup forceps to take multiple samples of the tumor at the bladder dome. Once sufficient samples were obtained, I then achieved hemostasis with bugbee electrocautery. The scope was then removed and an 18-Setswana marie catheter was placed without difficulty into the bladder with 10mL sterile water in the balloon. The patient tolerated the procedure well and post-operatively was transferred to the PACU in stable condition, extubated.       Pre-Op Antibiotic: Ciprofloxacin 400mg IV once  EBL: 20 mL  Complications: None    Specimen: Dome bladder tumor    Post Op Plan:   1) Catheter removal in 3 days  2) Will call with pathology      Electronically signed by Deion Miller MD on 1/12/2021 at 11:55 AM

## 2021-01-27 ENCOUNTER — TELEPHONE (OUTPATIENT)
Dept: PRIMARY CARE CLINIC | Age: 71
End: 2021-01-27

## 2021-01-27 ENCOUNTER — TELEPHONE (OUTPATIENT)
Dept: INTERNAL MEDICINE CLINIC | Age: 71
End: 2021-01-27

## 2021-01-27 DIAGNOSIS — Z79.4 TYPE 2 DIABETES MELLITUS WITHOUT COMPLICATION, WITH LONG-TERM CURRENT USE OF INSULIN (HCC): Primary | ICD-10-CM

## 2021-01-27 DIAGNOSIS — Z85.46 PERSONAL HISTORY OF MALIGNANT NEOPLASM OF PROSTATE: ICD-10-CM

## 2021-01-27 DIAGNOSIS — E11.9 TYPE 2 DIABETES MELLITUS WITHOUT COMPLICATION, WITH LONG-TERM CURRENT USE OF INSULIN (HCC): Primary | ICD-10-CM

## 2021-01-27 DIAGNOSIS — N18.2 CHRONIC RENAL FAILURE, STAGE 2 (MILD): ICD-10-CM

## 2021-01-27 NOTE — TELEPHONE ENCOUNTER
SW Contact to patient to verify he DOES NOT want referral to [employer] for assistance with ADLs and homemaking. Pt confirmed and declines at this time.

## 2021-01-27 NOTE — TELEPHONE ENCOUNTER
SW Contact per referral for home health support. Pt reports he has bigger issue of having to seek alternate housing. His home is in foreclosure. Discussed action plan to contact [employer] legal helpline to discuss this issue as well as options to apply for medicaid now that home is not of consideration around eligibility. Pt provided contact. Also spoke about assistance at home instructed patient to call [place] on aging to assist with housing options, etc.  Addressed that patient doesn't have ACP docs with Dxs that will not be cured / improved. Recommended a goals of care conversation. Pt agreeable to referral to [facility] for Goals of Care conversation. Referral made to [employer] to contact patient.

## 2021-01-27 NOTE — TELEPHONE ENCOUNTER
Notification from goals of care agent at [email or web address] - [facility]. She contacted patient and he stated he is unable to schedule a meeting with them today because he is feeling very overwhelmed and has to make calls about getting Medicaid and finding a place to stay. He said he has fallen and he is a fall risk and cannot take care of himself. They explained that hospice is covered by Medicare and they  could give him support, and that SW could help him with resources too, He took down Christen's number and said he would call, within the next 2 weeks. She states she encouraged him to meet  sooner, letting him know that he really needs the support and doesnt want to wait for a crisis to call. Resources explained that hospice could provide, and also let him know that its especially important at minimum to get his advance care planning done. He agreed, and said he would call soon.

## 2021-01-28 PROBLEM — Z01.818 PREOP EXAMINATION: Status: RESOLVED | Noted: 2020-11-25 | Resolved: 2021-01-28

## 2021-01-29 ENCOUNTER — TELEPHONE (OUTPATIENT)
Dept: INTERNAL MEDICINE CLINIC | Age: 71
End: 2021-01-29

## 2021-01-29 NOTE — TELEPHONE ENCOUNTER
Pt would like to speak to Dr Bernie Martinez regarding cancer he was told he had last week pls advise

## 2021-02-01 ENCOUNTER — TELEPHONE (OUTPATIENT)
Dept: INTERNAL MEDICINE CLINIC | Age: 71
End: 2021-02-01

## 2021-02-01 NOTE — TELEPHONE ENCOUNTER
----- Message from Marion Jones [facility] sent at 2/1/2021  3:57 PM EST -----  Regarding: Hospice Order Needed Nurse is on the [facility]  Hello patient had goals of care conversation this morning and has chosen to go with [facility] for hospice services. The nurse is on the way there to onboard. Can we get a order entered for Hospice ?  Thanks

## 2021-02-01 NOTE — TELEPHONE ENCOUNTER
Additional contact from [facility] goals of care agent Christen. Patient reconsidered and is now scheduled for visit for goals of care conversation and assessment for services on this date 11A scheduled.

## 2021-02-01 NOTE — TELEPHONE ENCOUNTER
Souleymane Fortune from [facility] called to see if Dr. Keegan Billingsley will be signing orders for patient to be admitted for Bladder Cancer. Per Dr. Keegan Billingsley ok to sign orders. Martita Bronson advised.

## 2021-02-01 NOTE — TELEPHONE ENCOUNTER
Jadon Acuna called needing to know if Dr. Herlinda Amos will follow-up patient at [facility]. Per Dr. Herlinda Amos ok . Verbal given.

## 2021-02-12 ENCOUNTER — TELEPHONE (OUTPATIENT)
Dept: INTERNAL MEDICINE CLINIC | Age: 71
End: 2021-02-12

## 2021-02-12 NOTE — TELEPHONE ENCOUNTER
Shira Edmondson advised per Dr. Madeline Pal. Shira Edmondson states patient weight is currently 290 lb, pt not having diarrhea like that to need a C-Diff.

## 2021-02-12 NOTE — TELEPHONE ENCOUNTER
Pt is having diarrhea and wants to know what he can do for it.  Moustapha Alvarado [phone] with [facility]

## 2021-02-18 RX ORDER — ATORVASTATIN CALCIUM 40 MG/1
TABLET, FILM COATED ORAL
Qty: 90 TABLET | Refills: 0 | Status: SHIPPED | OUTPATIENT
Start: 2021-02-18

## 2021-02-24 ENCOUNTER — TELEPHONE (OUTPATIENT)
Dept: INTERNAL MEDICINE CLINIC | Age: 71
End: 2021-02-24

## 2021-02-24 RX ORDER — OMEPRAZOLE 40 MG/1
CAPSULE, DELAYED RELEASE ORAL
Qty: 90 CAPSULE | Refills: 0 | Status: SHIPPED | OUTPATIENT
Start: 2021-02-24

## 2021-02-24 NOTE — TELEPHONE ENCOUNTER
Vandana Jarvis with [facility] called to let you know the pt is being referred to respiratory to have his cpap mask refitted she also stated that he has a spot on his jasmin and it is raised and looks cancerous, she states he has lost about 40 lbs. She was just calling to keep you informed.

## 2021-03-02 ENCOUNTER — APPOINTMENT (OUTPATIENT)
Dept: GENERAL RADIOLOGY | Age: 71
End: 2021-03-02
Payer: MEDICARE

## 2021-03-02 ENCOUNTER — HOSPITAL ENCOUNTER (EMERGENCY)
Age: 71
Discharge: HOSPICE | End: 2021-03-02
Attending: EMERGENCY MEDICINE
Payer: MEDICARE

## 2021-03-02 VITALS
SYSTOLIC BLOOD PRESSURE: 114 MMHG | RESPIRATION RATE: 20 BRPM | TEMPERATURE: 98.4 F | DIASTOLIC BLOOD PRESSURE: 64 MMHG | HEIGHT: 74 IN | BODY MASS INDEX: 36.83 KG/M2 | OXYGEN SATURATION: 99 % | WEIGHT: 287 LBS | HEART RATE: 79 BPM

## 2021-03-02 DIAGNOSIS — C67.9 BLADDER CARCINOMA (HCC): Primary | ICD-10-CM

## 2021-03-02 DIAGNOSIS — R53.1 GENERALIZED WEAKNESS: ICD-10-CM

## 2021-03-02 LAB
ANION GAP SERPL CALCULATED.3IONS-SCNC: 14 MMOL/L (ref 3–16)
BASOPHILS ABSOLUTE: 0.1 K/UL (ref 0–0.2)
BASOPHILS RELATIVE PERCENT: 0.6 %
BUN BLDV-MCNC: 16 MG/DL (ref 7–20)
CALCIUM SERPL-MCNC: 11.3 MG/DL (ref 8.3–10.6)
CHLORIDE BLD-SCNC: 97 MMOL/L (ref 99–110)
CO2: 25 MMOL/L (ref 21–32)
CREAT SERPL-MCNC: 0.7 MG/DL (ref 0.8–1.3)
EOSINOPHILS ABSOLUTE: 0.1 K/UL (ref 0–0.6)
EOSINOPHILS RELATIVE PERCENT: 1.1 %
GFR AFRICAN AMERICAN: >60
GFR NON-AFRICAN AMERICAN: >60
GLUCOSE BLD-MCNC: 194 MG/DL (ref 70–99)
HCT VFR BLD CALC: 37.8 % (ref 40.5–52.5)
HEMOGLOBIN: 11.9 G/DL (ref 13.5–17.5)
LYMPHOCYTES ABSOLUTE: 0.7 K/UL (ref 1–5.1)
LYMPHOCYTES RELATIVE PERCENT: 6.5 %
MCH RBC QN AUTO: 24 PG (ref 26–34)
MCHC RBC AUTO-ENTMCNC: 31.5 G/DL (ref 31–36)
MCV RBC AUTO: 76.1 FL (ref 80–100)
MONOCYTES ABSOLUTE: 0.6 K/UL (ref 0–1.3)
MONOCYTES RELATIVE PERCENT: 5.5 %
NEUTROPHILS ABSOLUTE: 9.6 K/UL (ref 1.7–7.7)
NEUTROPHILS RELATIVE PERCENT: 86.3 %
PDW BLD-RTO: 20.2 % (ref 12.4–15.4)
PLATELET # BLD: 396 K/UL (ref 135–450)
PMV BLD AUTO: 8 FL (ref 5–10.5)
POTASSIUM REFLEX MAGNESIUM: 4.8 MMOL/L (ref 3.5–5.1)
RBC # BLD: 4.97 M/UL (ref 4.2–5.9)
SARS-COV-2, NAAT: NOT DETECTED
SODIUM BLD-SCNC: 136 MMOL/L (ref 136–145)
WBC # BLD: 11.1 K/UL (ref 4–11)

## 2021-03-02 PROCEDURE — 80048 BASIC METABOLIC PNL TOTAL CA: CPT

## 2021-03-02 PROCEDURE — 99284 EMERGENCY DEPT VISIT MOD MDM: CPT

## 2021-03-02 PROCEDURE — 36415 COLL VENOUS BLD VENIPUNCTURE: CPT

## 2021-03-02 PROCEDURE — 87635 SARS-COV-2 COVID-19 AMP PRB: CPT

## 2021-03-02 PROCEDURE — 71045 X-RAY EXAM CHEST 1 VIEW: CPT

## 2021-03-02 PROCEDURE — 85025 COMPLETE CBC W/AUTO DIFF WBC: CPT

## 2021-03-02 ASSESSMENT — PAIN SCALES - GENERAL: PAINLEVEL_OUTOF10: 4

## 2021-03-02 ASSESSMENT — PAIN DESCRIPTION - DESCRIPTORS: DESCRIPTORS: ACHING

## 2021-03-02 ASSESSMENT — PAIN DESCRIPTION - LOCATION: LOCATION: NECK

## 2021-03-02 NOTE — ED NOTES
Report to transporting service.  Pt discharged to their care in [facility]     Alis Perez, REINA  03/02/21 [id number]

## 2021-03-02 NOTE — ED PROVIDER NOTES
[address] [facility]          ATTENDING PHYSICIAN NOTE       Date of evaluation: 3/2/2021    Chief Complaint     Fatigue (Diagnosed with bladder cancer 3 months ago. Has had increasing weakness since that time. Pt is getting hospice through [facility]. They requested he be sent here so they can see him and try to find placement.)      History of Present Illness     Nanci Laura is a 65 y.o. male who presents patient with increased weakness. Lives alone at home in an apartment. Barely can get around anymore. He is diagnosed with bladder carcinoma 3 months ago. He was told that it is not treatable. He is followed by detox hospice at home. He was referred to the emergency room because of his weakness and needing placement. He fell this morning but denies any significant injuries. He says his legs just will not hold him up anymore. Review of Systems     Review of Systems  Has difficulty urinating and uses a diaper. Denies fever chills. Denies increased pain but increased weakness especially where he feels like his legs can no longer support him otherwise negative review of systems reported by patient  Past Medical, Surgical, Family, and Social History     He has a past medical history of Cancer ([facility] [address] Hematuria, Hyperlipidemia, Hypertension, Sleep apnea, and Type II or unspecified type diabetes mellitus without mention of complication, not stated as uncontrolled. He has a past surgical history that includes Appendectomy; Prostate surgery (2007); Colonoscopy (1998); and Cystoscopy (N/A, 1/12/2021). His family history includes Coronary Art Dis in his father; Diabetes in his brother; Heart Disease in his father. He reports that he has been smoking. He started smoking about 55 years ago. He has a 100.00 pack-year smoking history. He has never used smokeless tobacco. He reports that he does not drink alcohol or use drugs.     Medications     Previous Medications ALLOPURINOL (ZYLOPRIM) 300 MG TABLET    TAKE ONE TABLET BY MOUTH DAILY    ASPIRIN 81 MG EC TABLET    Take 81 mg by mouth daily. ATORVASTATIN (LIPITOR) 40 MG TABLET    TAKE ONE TABLET BY MOUTH DAILY    B-D ULTRA FINE LANCETS MISC    by Does not apply route. 29 g x12mm     BLOOD GLUCOSE MONITORING SUPPL (ONE TOUCH ULTRA)    by Does not apply route. Test strips    CHOLECALCIFEROL (VITAMIN D3) 1000 UNITS CAPS    Take by mouth 2 times daily    DULAGLUTIDE 0.75 MG/0.5ML SOPN    Inject 1.5 mg into the skin once a week    FISH OIL-OMEGA-3 FATTY ACIDS (FISH OIL) 1000 MG CAPSULE    Take 1 g by mouth daily     GABAPENTIN (NEURONTIN) 300 MG CAPSULE    TAKE ONE CAPSULE BY MOUTH THREE TIMES A DAY    INSULIN GLARGINE (TOUJEO SOLOSTAR) 300 UNIT/ML INJECTION PEN    Inject 10 Units into the skin nightly     LISINOPRIL (PRINIVIL;ZESTRIL) 30 MG TABLET    Take 1 tablet by mouth daily    METFORMIN (GLUCOPHAGE) 500 MG TABLET    TAKE TWO TABLETS BY MOUTH TWICE A DAY    METHEN-HYOSC-METH BLUE-NA PHOS (UROGESIC-BLUE) 81.6 MG TABS    TAKE 1 TABLET BY MOUTH 3 TIMES A DAY AS NEEDED FOR BLADDER PAIN AND BURNING    OMEPRAZOLE (PRILOSEC) 40 MG DELAYED RELEASE CAPSULE    TAKE ONE CAPSULE BY MOUTH DAILY    PIOGLITAZONE (ACTOS) 15 MG TABLET    Take 15 mg by mouth daily    TROSPIUM (SANCTURA) 20 MG TABLET    Take 1 tablet by mouth 2 times daily       Allergies     He has No Known Allergies. Physical Exam     INITIAL VITALS: BP: 118/81, Temp: 98.4 °F (36.9 °C), Pulse: 79, Resp: 16, SpO2: 98 %   Physical Exam  Vitals signs and nursing note reviewed. Constitutional:       Appearance: He is well-developed. He is ill-appearing (Chronic. States that he has lost over 60 pounds in the last several weeks). He is not diaphoretic. HENT:      Head: Normocephalic and atraumatic. Eyes:      General: No scleral icterus. Extraocular Movements: Extraocular movements intact. Neck:      Musculoskeletal: Neck supple.    Cardiovascular: Rate and Rhythm: Normal rate and regular rhythm. Pulmonary:      Effort: Pulmonary effort is normal.   Abdominal:      Palpations: Abdomen is soft. Tenderness: There is no abdominal tenderness. Musculoskeletal:         General: No swelling. Right lower leg: No edema. Left lower leg: No edema. Skin:     General: Skin is warm and dry. Neurological:      Mental Status: He is alert. Comments: He is alert and oriented x3.  5/5 motor strength upper extremities. 3+ to 4/5 lower extremities but symmetrical.  Diminished knee jerks bilaterally. Able to dorsiflex great toe. Sensory intact to touch upper and lower extremities. Cranial nerves without obvious deficits. Unable to assess gait secondary to weakness and inability to stand on own   Psychiatric:         Behavior: Behavior normal.         Diagnostic Results     EKG       RADIOLOGY:  XR CHEST PORTABLE   Final Result      No acute disease             LABS:   No results found for this visit on 03/02/21. RECENT VITALS:  BP: 118/81,Temp: 98.4 °F (36.9 °C), Pulse: 79, Resp: 16, SpO2: 98 %     Procedures         ED Course     Nursing Notes, Past Medical Hx, Past Surgical Hx, Social Hx,Allergies, and Family Hx were reviewed. patient was given the following medications:  No orders of the defined types were placed in this encounter.       CONSULTS:  IP CONSULT TO PRIMARY CARE PROVIDER    MEDICAL DECISIONMAKING / Edgardo Gallagher / William Villavicencio Brittani Jimenez is a 65 y.o. male who presents with weakness after recently being diagnosed with bladder cancer which is not treatable per patient. I discussed the case with the family doctor. Patient is in hospice at home. He is not able to take care of self at home. He cannot ambulate without falling. He has had significant weight loss. I talked to the patient in detail about work-up. Patient does not want further imaging of spine like MRIs to determine if that could be the etiology of his leg weakness. He agreed to renal profile and CBC. I asked social work to come down to evaluate patient to see if we can get him placement within a nursing home and how that works with [facility] hospice. Social work came down to evaluate patient. Please see their notes. [facility] hospice is here also evaluating patient. Plan is to get patient into a nursing home facility. Plan is to confirm with patient for no further work-up. Patient is alert and oriented x3 and able to make these healthcare decisions for himself. Clinical Impression     1. Bladder carcinoma ([facility] [address]    2.  Generalized weakness        Disposition     PATIENT REFERRED TO:  Kassi Hammonds MD  [address]  [address] [address] [facility]  [phone]            DISCHARGE MEDICATIONS:  New Prescriptions    No medications on file       Grant Galeazzi, MD  03/02/21 [id number]

## 2021-03-02 NOTE — ED NOTES
[facility] nurse remains at bedside.  Informed of negative COVID test. States she will arrange transport     Keron Esparza RN  03/02/21 0799

## 2021-03-02 NOTE — ED TRIAGE NOTES
Diagnosed with bladder cancer 3 months ago. Has had increasing weakness since that time. Pt is getting hospice through [facility]. They requested he be sent here so they can see him and try to find placement. Pt also states he fell this morning. No LOC.  States his legs won't hold him anymore

## 2021-03-02 NOTE — CARE COORDINATION
Referred to patient for possible placement. Spoke to patient and [facility] nurse. Patient sent to ER after fall. Patient does not feel he can manage at home. [facility] has been working to place patient in nursing home, but does not have the arrangements made. They are agreeable for patient to go to Inpatient Unit. MD and RN updated. [facility] to arrange transfer for today.   Electronically signed by AARON Cornelius LISW-S on 3/2/2021 at 2:10 PM

## 2021-03-24 RX ORDER — ALLOPURINOL 300 MG/1
TABLET ORAL
Qty: 90 TABLET | Refills: 0 | Status: SHIPPED | OUTPATIENT
Start: 2021-03-24

## 2021-03-29 ENCOUNTER — TELEPHONE (OUTPATIENT)
Dept: INTERNAL MEDICINE CLINIC | Age: 71
End: 2021-03-29

## 2021-03-29 NOTE — TELEPHONE ENCOUNTER
Pt was on the continuous care and hospice unit at [facility] and Deb Gallardo is calling to inform Dr Marisela Freire he passed away on [employer] due to Bladder Cancer stated. I also went and cancelled pt appt he had coming up in May.  pls advise if needed

## 2021-05-18 RX ORDER — ATORVASTATIN CALCIUM 40 MG/1
TABLET, FILM COATED ORAL
Qty: 90 TABLET | Refills: 0 | OUTPATIENT
Start: 2021-05-18

## 2021-05-26 RX ORDER — OMEPRAZOLE 40 MG/1
CAPSULE, DELAYED RELEASE ORAL
Qty: 90 CAPSULE | Refills: 0 | OUTPATIENT
Start: 2021-05-26

## (undated) DEVICE — 60 ML SYRINGE,CATHETER TIP: Brand: MONOJECT

## (undated) DEVICE — CATHETER URETH 20FR BLLN 5CC SIL F INDWL 2 W SHT LEN STD

## (undated) DEVICE — SOLUTION IRRIGATION STRL H2O 3000 ML 4/CA

## (undated) DEVICE — SET IRRIG L94IN ID0.281IN W/ 4.5IN DST FLX CONN 2 LD ON OFF

## (undated) DEVICE — GARMENT,MEDLINE,DVT,INT,CALF,LG, GEN2: Brand: MEDLINE

## (undated) DEVICE — GAUZE,SPONGE,4"X4",16PLY,XRAY,STRL,LF: Brand: MEDLINE

## (undated) DEVICE — SYRINGE MED 30ML STD CLR PLAS LUERLOCK TIP N CTRL DISP

## (undated) DEVICE — BAG DRNGE 2000ML UROLOGY ANTI REFLX CHMBR SAMP PRT

## (undated) DEVICE — PLATE ES AD W 9FT CRD 2

## (undated) DEVICE — PREP TRAY 10X5X2: Brand: MEDLINE INDUSTRIES, INC.

## (undated) DEVICE — TRAY PREP DRY W/ PREM GLV 2 APPL 6 SPNG 2 UNDPD 1 OVERWRAP

## (undated) DEVICE — BASIC SINGLE BASIN 1-LF: Brand: MEDLINE INDUSTRIES, INC.

## (undated) DEVICE — JELLY LUBE BTL MDS032275 4OZ

## (undated) DEVICE — GOWN,SIRUS,POLYRNF,BRTHSLV,LG,30/CS: Brand: MEDLINE

## (undated) DEVICE — PACK,CYSTOSCOPY,PK III,AURORA: Brand: MEDLINE

## (undated) DEVICE — SOLUTION ST WATER 1500ML W/H

## (undated) DEVICE — GLOVE SURG SZ 65 THK91MIL LTX FREE SYN POLYISOPRENE